# Patient Record
Sex: FEMALE | Race: BLACK OR AFRICAN AMERICAN | Employment: FULL TIME | URBAN - METROPOLITAN AREA
[De-identification: names, ages, dates, MRNs, and addresses within clinical notes are randomized per-mention and may not be internally consistent; named-entity substitution may affect disease eponyms.]

---

## 2021-03-16 ENCOUNTER — HOSPITAL ENCOUNTER (EMERGENCY)
Age: 31
Discharge: HOME | End: 2021-03-16
Attending: EMERGENCY MEDICINE

## 2021-03-16 VITALS
WEIGHT: 190 LBS | TEMPERATURE: 98.2 F | OXYGEN SATURATION: 100 % | SYSTOLIC BLOOD PRESSURE: 151 MMHG | RESPIRATION RATE: 18 BRPM | HEART RATE: 96 BPM | BODY MASS INDEX: 34.96 KG/M2 | DIASTOLIC BLOOD PRESSURE: 69 MMHG | HEIGHT: 62 IN

## 2021-03-16 DIAGNOSIS — K64.4 EXTERNAL HEMORRHOID: Primary | ICD-10-CM

## 2021-03-16 PROCEDURE — 99282 EMERGENCY DEPT VISIT SF MDM: CPT

## 2021-03-16 RX ORDER — HYDROCORTISONE ACETATE 25 MG/1
25 SUPPOSITORY RECTAL 2 TIMES DAILY
Qty: 12 SUPPOSITORY | Refills: 1 | Status: SHIPPED | OUTPATIENT
Start: 2021-03-16 | End: 2022-01-01

## 2021-03-16 RX ORDER — DOCUSATE SODIUM 100 MG/1
100 CAPSULE, LIQUID FILLED ORAL 2 TIMES DAILY
Qty: 60 CAP | Refills: 0 | Status: SHIPPED | OUTPATIENT
Start: 2021-03-16 | End: 2021-04-15

## 2021-03-16 NOTE — ED PROVIDER NOTES
39-year-old female presents with complaint of anal pain that worsened this morning. Patient reports recent loose stools. States occasional small amount of blood with wiping. Denies any recent straining or constipation. Denies abdominal pain, nausea, vomiting, fever, chills, dysuria, hematuria, pelvic pain, vaginal bleeding, vaginal discharge, flank pain, dizziness, weakness. Patient denies any recent sick contact. Patient states last menstrual cycle was around a week ago. Patient declines urine pregnancy test at this time. Patient states that she needs a work note for today and yesterday. No past medical history on file. No past surgical history on file. No family history on file.     Social History     Socioeconomic History    Marital status: SINGLE     Spouse name: Not on file    Number of children: Not on file    Years of education: Not on file    Highest education level: Not on file   Occupational History    Not on file   Social Needs    Financial resource strain: Not on file    Food insecurity     Worry: Not on file     Inability: Not on file    Transportation needs     Medical: Not on file     Non-medical: Not on file   Tobacco Use    Smoking status: Never Smoker   Substance and Sexual Activity    Alcohol use: No    Drug use: Not on file    Sexual activity: Not on file   Lifestyle    Physical activity     Days per week: Not on file     Minutes per session: Not on file    Stress: Not on file   Relationships    Social connections     Talks on phone: Not on file     Gets together: Not on file     Attends Confucianism service: Not on file     Active member of club or organization: Not on file     Attends meetings of clubs or organizations: Not on file     Relationship status: Not on file    Intimate partner violence     Fear of current or ex partner: Not on file     Emotionally abused: Not on file     Physically abused: Not on file     Forced sexual activity: Not on file Other Topics Concern    Not on file   Social History Narrative    Not on file         ALLERGIES: Patient has no known allergies. Review of Systems   Constitutional: Negative for chills, fatigue and fever. HENT: Negative for congestion and rhinorrhea. Respiratory: Negative for cough and shortness of breath. Cardiovascular: Negative for chest pain. Gastrointestinal: Positive for diarrhea and rectal pain. Negative for abdominal pain, blood in stool, constipation, nausea and vomiting. Genitourinary: Negative for dysuria, flank pain, pelvic pain, vaginal bleeding and vaginal discharge. Musculoskeletal: Negative for arthralgias and back pain. Skin: Negative for color change, rash and wound. Neurological: Negative for dizziness, weakness, light-headedness and headaches. Vitals:    03/16/21 0612   BP: (!) 151/69   Pulse: 96   Resp: 18   Temp: 98.2 °F (36.8 °C)   SpO2: 100%   Weight: 86.2 kg (190 lb)   Height: 5' 2\" (1.575 m)            Physical Exam  Vitals signs and nursing note reviewed. Exam conducted with a chaperone present. Constitutional:       Appearance: Normal appearance. HENT:      Head: Normocephalic. Mouth/Throat:      Mouth: Mucous membranes are moist.   Eyes:      Extraocular Movements: Extraocular movements intact. Pupils: Pupils are equal, round, and reactive to light. Cardiovascular:      Rate and Rhythm: Normal rate. Pulses: Normal pulses. Pulmonary:      Effort: Pulmonary effort is normal.      Breath sounds: Normal breath sounds. Abdominal:      General: Bowel sounds are normal.      Palpations: Abdomen is soft. Tenderness: There is no abdominal tenderness. There is no guarding or rebound. Comments: Soft, nontender, nondistended. No rebound or guarding. No peritoneal signs. Genitourinary:     Comments: RENE Castillo present for exam.  Small external hemorrhoid noted. No evidence of thrombosed external hemorrhoid.   No bright red blood per rectum. Brown stool. Hemoccult negative. No fissures. No findings suggestive of perianal abscess. Skin:     General: Skin is warm. Findings: No rash. Neurological:      General: No focal deficit present. Mental Status: She is alert and oriented to person, place, and time. Motor: No weakness. MDM  Number of Diagnoses or Management Options  External hemorrhoid: minor  Diagnosis management comments: VSS. Afebrile. Patient with findings small external hemorrhoid noted. No evidence of thrombosed external hemorrhoid. No fissures. No bright red blood per rectum. Brown stool. Hemoccult negative. Abdomen soft, nontender with no rebound or guarding. Patient declines urine pregnancy test.  Patient states that \"there is no way that I am pregnant at this time\". Will discharge home with Anusol, Colace, instructions to follow PCP. Patient given strict return precautions       Amount and/or Complexity of Data Reviewed  Review and summarize past medical records: yes    Risk of Complications, Morbidity, and/or Mortality  Presenting problems: low  Diagnostic procedures: minimal  Management options: minimal    Patient Progress  Patient progress: stable         Procedures                   Tc Gao MD; 3/16/2021 @6:19 AM Voice dictation software was used during the making of this note. This software is not perfect and grammatical and other typographical errors may be present.   This note has not been proofread for errors.  ===================================================================

## 2021-03-16 NOTE — ED NOTES
I have reviewed discharge instructions with the patient. The patient verbalized understanding. Patient left ED via Discharge Method: ambulatory to Home with friend. Opportunity for questions and clarification provided. Patient given 2 scripts. To continue your aftercare when you leave the hospital, you may receive an automated call from our care team to check in on how you are doing. This is a free service and part of our promise to provide the best care and service to meet your aftercare needs.  If you have questions, or wish to unsubscribe from this service please call [phone]. Thank you for Choosing our [facility].

## 2021-03-16 NOTE — Clinical Note
[address] [address] EMERGENCY DEPT 
[address] 
Kisha [place] [id number] [phone] Work/School Note Date: 3/16/2021 To Whom It May concern: 
 
 
Georgina Roland was seen and treated today in the emergency room by the following provider(s): 
Attending Provider: Silas Man MD.   
 
Georgina Roland is excused from work/school on 03/16/21. She is clear to return to work/school on 03/17/21.    
 
 
Sincerely, 
 
 
 
 
Babatunde Mulligan MD

## 2021-03-16 NOTE — DISCHARGE INSTRUCTIONS
Use Anusol suppositories as directed. Eat high-fiber diet drink plenty of fluids. Take stool softeners directed. Schedule close follow-up primary care physician. Return to ED if symptoms worsen or progress in any way.

## 2021-12-29 ENCOUNTER — HOSPITAL ENCOUNTER (INPATIENT)
Age: 31
LOS: 3 days | Discharge: HOME | DRG: 812 | End: 2022-01-01
Attending: EMERGENCY MEDICINE | Admitting: FAMILY MEDICINE

## 2021-12-29 ENCOUNTER — APPOINTMENT (OUTPATIENT)
Dept: GENERAL RADIOLOGY | Age: 31
DRG: 812 | End: 2021-12-29
Attending: EMERGENCY MEDICINE

## 2021-12-29 DIAGNOSIS — E66.09 CLASS 2 OBESITY DUE TO EXCESS CALORIES WITHOUT SERIOUS COMORBIDITY WITH BODY MASS INDEX (BMI) OF 37.0 TO 37.9 IN ADULT: Chronic | ICD-10-CM

## 2021-12-29 DIAGNOSIS — N92.0 MENORRHAGIA WITH REGULAR CYCLE: Chronic | ICD-10-CM

## 2021-12-29 DIAGNOSIS — N93.9 ABNORMAL UTERINE BLEEDING (AUB): ICD-10-CM

## 2021-12-29 DIAGNOSIS — D50.0 IRON DEFICIENCY ANEMIA DUE TO CHRONIC BLOOD LOSS: Primary | ICD-10-CM

## 2021-12-29 DIAGNOSIS — R07.9 CHEST PAIN, UNSPECIFIED TYPE: ICD-10-CM

## 2021-12-29 PROBLEM — K92.2 GASTROINTESTINAL BLEED: Status: ACTIVE | Noted: 2021-12-29

## 2021-12-29 PROBLEM — D62 ACUTE BLOOD LOSS ANEMIA: Status: ACTIVE | Noted: 2021-12-29

## 2021-12-29 LAB
ALBUMIN SERPL-MCNC: 3.3 G/DL (ref 3.5–5)
ALBUMIN/GLOB SERPL: 0.7 {RATIO} (ref 1.2–3.5)
ALP SERPL-CCNC: 71 U/L (ref 50–136)
ALT SERPL-CCNC: 12 U/L (ref 12–65)
ANION GAP SERPL CALC-SCNC: 4 MMOL/L (ref 7–16)
AST SERPL-CCNC: 11 U/L (ref 15–37)
ATRIAL RATE: 100 BPM
BASOPHILS # BLD: 0 K/UL (ref 0–0.2)
BASOPHILS NFR BLD: 1 % (ref 0–2)
BILIRUB SERPL-MCNC: 0.2 MG/DL (ref 0.2–1.1)
BUN SERPL-MCNC: 5 MG/DL (ref 6–23)
CALCIUM SERPL-MCNC: 8.5 MG/DL (ref 8.3–10.4)
CALCULATED P AXIS, ECG09: 57 DEGREES
CALCULATED R AXIS, ECG10: 65 DEGREES
CALCULATED T AXIS, ECG11: 54 DEGREES
CHLORIDE SERPL-SCNC: 112 MMOL/L (ref 98–107)
CO2 SERPL-SCNC: 22 MMOL/L (ref 21–32)
COVID-19 RAPID TEST, COVR: NOT DETECTED
CREAT SERPL-MCNC: 0.55 MG/DL (ref 0.6–1)
DIAGNOSIS, 93000: NORMAL
DIFFERENTIAL METHOD BLD: ABNORMAL
EOSINOPHIL # BLD: 0.2 K/UL (ref 0–0.8)
EOSINOPHIL NFR BLD: 3 % (ref 0.5–7.8)
ERYTHROCYTE [DISTWIDTH] IN BLOOD BY AUTOMATED COUNT: 22.5 % (ref 11.9–14.6)
GLOBULIN SER CALC-MCNC: 4.7 G/DL (ref 2.3–3.5)
GLUCOSE SERPL-MCNC: 100 MG/DL (ref 65–100)
HCT VFR BLD AUTO: 16.8 % (ref 35.8–46.3)
HCT VFR BLD AUTO: 23.2 % (ref 35.8–46.3)
HGB BLD-MCNC: 4 G/DL (ref 11.7–15.4)
HGB BLD-MCNC: 6.5 G/DL (ref 11.7–15.4)
HISTORY CHECKED?,CKHIST: NORMAL
IMM GRANULOCYTES # BLD AUTO: 0 K/UL (ref 0–0.5)
IMM GRANULOCYTES NFR BLD AUTO: 0 % (ref 0–5)
LIPASE SERPL-CCNC: 54 U/L (ref 73–393)
LYMPHOCYTES # BLD: 2.2 K/UL (ref 0.5–4.6)
LYMPHOCYTES NFR BLD: 36 % (ref 13–44)
MAGNESIUM SERPL-MCNC: 2.5 MG/DL (ref 1.8–2.4)
MCH RBC QN AUTO: 13.7 PG (ref 26.1–32.9)
MCHC RBC AUTO-ENTMCNC: 23.8 G/DL (ref 31.4–35)
MCV RBC AUTO: 57.5 FL (ref 79.6–97.8)
MONOCYTES # BLD: 0.3 K/UL (ref 0.1–1.3)
MONOCYTES NFR BLD: 6 % (ref 4–12)
NEUTS SEG # BLD: 3.3 K/UL (ref 1.7–8.2)
NEUTS SEG NFR BLD: 54 % (ref 43–78)
NRBC # BLD: 0 K/UL (ref 0–0.2)
P-R INTERVAL, ECG05: 122 MS
PLATELET # BLD AUTO: 407 K/UL (ref 150–450)
PMV BLD AUTO: ABNORMAL FL (ref 9.4–12.3)
POTASSIUM SERPL-SCNC: 3.9 MMOL/L (ref 3.5–5.1)
PROT SERPL-MCNC: 8 G/DL (ref 6.3–8.2)
Q-T INTERVAL, ECG07: 384 MS
QRS DURATION, ECG06: 68 MS
QTC CALCULATION (BEZET), ECG08: 495 MS
RBC # BLD AUTO: 2.92 M/UL (ref 4.05–5.2)
SODIUM SERPL-SCNC: 138 MMOL/L (ref 136–145)
SOURCE, COVRS: NORMAL
TROPONIN-HIGH SENSITIVITY: 42 PG/ML (ref 0–14)
TROPONIN-HIGH SENSITIVITY: 43.6 PG/ML (ref 0–14)
VENTRICULAR RATE, ECG03: 100 BPM
WBC # BLD AUTO: 6.2 K/UL (ref 4.3–11.1)

## 2021-12-29 PROCEDURE — 30233N1 TRANSFUSION OF NONAUTOLOGOUS RED BLOOD CELLS INTO PERIPHERAL VEIN, PERCUTANEOUS APPROACH: ICD-10-PCS | Performed by: INTERNAL MEDICINE

## 2021-12-29 PROCEDURE — 93005 ELECTROCARDIOGRAM TRACING: CPT | Performed by: EMERGENCY MEDICINE

## 2021-12-29 PROCEDURE — 94762 N-INVAS EAR/PLS OXIMTRY CONT: CPT

## 2021-12-29 PROCEDURE — 85018 HEMOGLOBIN: CPT

## 2021-12-29 PROCEDURE — 86900 BLOOD TYPING SEROLOGIC ABO: CPT

## 2021-12-29 PROCEDURE — 83690 ASSAY OF LIPASE: CPT

## 2021-12-29 PROCEDURE — 80053 COMPREHEN METABOLIC PANEL: CPT

## 2021-12-29 PROCEDURE — 36430 TRANSFUSION BLD/BLD COMPNT: CPT

## 2021-12-29 PROCEDURE — 85025 COMPLETE CBC W/AUTO DIFF WBC: CPT

## 2021-12-29 PROCEDURE — 86923 COMPATIBILITY TEST ELECTRIC: CPT

## 2021-12-29 PROCEDURE — P9016 RBC LEUKOCYTES REDUCED: HCPCS

## 2021-12-29 PROCEDURE — 71046 X-RAY EXAM CHEST 2 VIEWS: CPT

## 2021-12-29 PROCEDURE — 65270000029 HC RM PRIVATE

## 2021-12-29 PROCEDURE — 83735 ASSAY OF MAGNESIUM: CPT

## 2021-12-29 PROCEDURE — 87635 SARS-COV-2 COVID-19 AMP PRB: CPT

## 2021-12-29 PROCEDURE — 36415 COLL VENOUS BLD VENIPUNCTURE: CPT

## 2021-12-29 PROCEDURE — 84484 ASSAY OF TROPONIN QUANT: CPT

## 2021-12-29 PROCEDURE — 99284 EMERGENCY DEPT VISIT MOD MDM: CPT

## 2021-12-29 RX ORDER — SODIUM CHLORIDE 0.9 % (FLUSH) 0.9 %
5-40 SYRINGE (ML) INJECTION EVERY 8 HOURS
Status: DISCONTINUED | OUTPATIENT
Start: 2021-12-29 | End: 2021-12-30

## 2021-12-29 RX ORDER — SODIUM CHLORIDE 9 MG/ML
250 INJECTION, SOLUTION INTRAVENOUS AS NEEDED
Status: DISCONTINUED | OUTPATIENT
Start: 2021-12-29 | End: 2021-12-30

## 2021-12-29 RX ORDER — POLYETHYLENE GLYCOL 3350 17 G/17G
17 POWDER, FOR SOLUTION ORAL DAILY PRN
Status: DISCONTINUED | OUTPATIENT
Start: 2021-12-29 | End: 2022-01-01 | Stop reason: HOSPADM

## 2021-12-29 RX ORDER — ONDANSETRON 4 MG/1
4 TABLET, ORALLY DISINTEGRATING ORAL
Status: DISCONTINUED | OUTPATIENT
Start: 2021-12-29 | End: 2022-01-01 | Stop reason: HOSPADM

## 2021-12-29 RX ORDER — ONDANSETRON 2 MG/ML
4 INJECTION INTRAMUSCULAR; INTRAVENOUS
Status: DISCONTINUED | OUTPATIENT
Start: 2021-12-29 | End: 2022-01-01 | Stop reason: HOSPADM

## 2021-12-29 RX ORDER — SODIUM CHLORIDE 0.9 % (FLUSH) 0.9 %
5-10 SYRINGE (ML) INJECTION EVERY 8 HOURS
Status: DISCONTINUED | OUTPATIENT
Start: 2021-12-29 | End: 2022-01-01 | Stop reason: HOSPADM

## 2021-12-29 RX ORDER — SODIUM CHLORIDE 0.9 % (FLUSH) 0.9 %
5-40 SYRINGE (ML) INJECTION AS NEEDED
Status: DISCONTINUED | OUTPATIENT
Start: 2021-12-29 | End: 2022-01-01 | Stop reason: HOSPADM

## 2021-12-29 RX ORDER — ENOXAPARIN SODIUM 100 MG/ML
40 INJECTION SUBCUTANEOUS DAILY
Status: CANCELLED | OUTPATIENT
Start: 2021-12-30

## 2021-12-29 RX ORDER — ACETAMINOPHEN 650 MG/1
650 SUPPOSITORY RECTAL
Status: DISCONTINUED | OUTPATIENT
Start: 2021-12-29 | End: 2022-01-01 | Stop reason: HOSPADM

## 2021-12-29 RX ORDER — ACETAMINOPHEN 325 MG/1
650 TABLET ORAL
Status: DISCONTINUED | OUTPATIENT
Start: 2021-12-29 | End: 2022-01-01 | Stop reason: HOSPADM

## 2021-12-29 RX ORDER — SODIUM CHLORIDE 0.9 % (FLUSH) 0.9 %
5-10 SYRINGE (ML) INJECTION AS NEEDED
Status: DISCONTINUED | OUTPATIENT
Start: 2021-12-29 | End: 2022-01-01 | Stop reason: HOSPADM

## 2021-12-29 RX ADMIN — Medication 10 ML: at 21:04

## 2021-12-29 NOTE — ED PROVIDER NOTES
Mask was worn during the entire patient examination. Rusty Rodriguez is a 27 y.o. female who presents to the ED with a chief complaint of fatigue for several weeks along with some headaches. She states that last night she began to have some shortness of breath and chest pain. She also had a cough. On further questioning her she does admit to longstanding history of heavy vaginal periods. She is not having any of these today. She is never been on iron pills. Never noted that she was anemic. No past medical history on file. No past surgical history on file. No family history on file. Social History     Socioeconomic History    Marital status: SINGLE     Spouse name: Not on file    Number of children: Not on file    Years of education: Not on file    Highest education level: Not on file   Occupational History    Not on file   Tobacco Use    Smoking status: Never Smoker    Smokeless tobacco: Not on file   Substance and Sexual Activity    Alcohol use: No    Drug use: Not on file    Sexual activity: Not on file   Other Topics Concern    Not on file   Social History Narrative    Not on file     Social Determinants of Health     Financial Resource Strain:     Difficulty of Paying Living Expenses: Not on file   Food Insecurity:     Worried About Running Out of Food in the Last Year: Not on file    Donell of Food in the Last Year: Not on file   Transportation Needs:     Lack of Transportation (Medical): Not on file    Lack of Transportation (Non-Medical):  Not on file   Physical Activity:     Days of Exercise per Week: Not on file    Minutes of Exercise per Session: Not on file   Stress:     Feeling of Stress : Not on file   Social Connections:     Frequency of Communication with Friends and Family: Not on file    Frequency of Social Gatherings with Friends and Family: Not on file    Attends Pentecostalism Services: Not on file    Active Member of Clubs or Organizations: Not on file    Attends Club or Organization Meetings: Not on file    Marital Status: Not on file   Intimate Partner Violence:     Fear of Current or Ex-Partner: Not on file    Emotionally Abused: Not on file    Physically Abused: Not on file    Sexually Abused: Not on file   Housing Stability:     Unable to Pay for Housing in the Last Year: Not on file    Number of [place] in the Last Year: Not on file    Unstable Housing in the Last Year: Not on file         ALLERGIES: Patient has no known allergies. Review of Systems   Constitutional: Positive for fatigue. Negative for activity change, chills and fever. Respiratory: Positive for cough and shortness of breath. Negative for apnea, chest tightness, wheezing and stridor. Cardiovascular: Positive for chest pain. Negative for palpitations and leg swelling. Gastrointestinal: Negative for abdominal distention, abdominal pain, diarrhea, nausea and vomiting. Genitourinary: Positive for vaginal bleeding. Musculoskeletal: Negative for neck pain and neck stiffness. Skin: Negative for pallor and rash. Neurological: Positive for weakness. All other systems reviewed and are negative. Vitals:    12/29/21 1419   BP: 128/68   Pulse: 96   Resp: 16   Temp: 98.6 °F (37 °C)   SpO2: 100%   Weight: 93 kg (205 lb)   Height: 5' 2\" (1.575 m)            Physical Exam  Vitals and nursing note reviewed. Constitutional:       General: She is not in acute distress. Appearance: She is well-developed. She is not ill-appearing, toxic-appearing or diaphoretic. HENT:      Head: Normocephalic and atraumatic. Mouth/Throat:      Mouth: Mucous membranes are moist.   Eyes:      General: No scleral icterus. Right eye: No discharge. Left eye: No discharge. Comments: Pale conjuncitiva. Neck:      Thyroid: No thyromegaly. Cardiovascular:      Rate and Rhythm: Normal rate and regular rhythm. Heart sounds: Normal heart sounds.  Heart sounds not distant. No murmur heard. No systolic murmur is present. Pulmonary:      Effort: Pulmonary effort is normal. No tachypnea, accessory muscle usage or respiratory distress. Breath sounds: No stridor. No decreased breath sounds, wheezing, rhonchi or rales. Chest:      Chest wall: No tenderness, crepitus or edema. There is no dullness to percussion. Abdominal:      Palpations: Abdomen is soft. There is no mass. Tenderness: There is no abdominal tenderness. There is no guarding or rebound. Musculoskeletal:      Cervical back: Normal range of motion and neck supple. No rigidity. Right lower leg: No tenderness. No edema. Left lower leg: No tenderness. No edema. Lymphadenopathy:      Cervical: No cervical adenopathy. Skin:     Capillary Refill: Capillary refill takes less than 2 seconds. Neurological:      General: No focal deficit present. Mental Status: She is alert and oriented to person, place, and time. Mental status is at baseline. Psychiatric:         Mood and Affect: Mood normal. Mood is not anxious. Behavior: Behavior normal. Behavior is not agitated. MDM  Number of Diagnoses or Management Options  Diagnosis management comments: Patient has severe anemia with hemoglobin of 4 thus I went ahead and order 2 units instead of the 1 recommended by our blood bank department as she is obviously got any more than 1 unit of packed red blood cells. She will be admitted for further evaluation of this anemia. Bety Horn MD; 12/29/2021 @3:02 PM Voice dictation software was used during the making of this note. This software is not perfect and grammatical and other typographical errors may be present.   This note has not been proofread for errors.  ====================================        Amount and/or Complexity of Data Reviewed  Clinical lab tests: ordered and reviewed (Results for orders placed or performed during the hospital encounter of 12/29/21  -TROPONIN-HIGH SENSITIVITY:        Result                      Value             Ref Range           Troponin-High Sensitiv*     43.6 (H)          0 - 14 pg/mL   -CBC WITH AUTOMATED DIFF:        Result                      Value             Ref Range           WBC                         6.2               4.3 - 11.1 K*       RBC                         2.92 (L)          4.05 - 5.2 M*       HGB                         4.0 (LL)          11.7 - 15.4 *       HCT                         16.8 (L)          35.8 - 46.3 %       MCV                         57.5 (L)          79.6 - 97.8 *       MCH                         13.7 (L)          26.1 - 32.9 *       MCHC                        23.8 (L)          31.4 - 35.0 *       RDW                         22.5 (H)          11.9 - 14.6 %       PLATELET                    407               150 - 450 K/*       MPV                                           9.4 - 12.3 FL   Unable to calculate. Recommend adding IPF. ABSOLUTE NRBC               0.00              0.0 - 0.2 K/*       DF                          AUTOMATED                             NEUTROPHILS                 54                43 - 78 %           LYMPHOCYTES                 36                13 - 44 %           MONOCYTES                   6                 4.0 - 12.0 %        EOSINOPHILS                 3                 0.5 - 7.8 %         BASOPHILS                   1                 0.0 - 2.0 %         IMMATURE GRANULOCYTES       0                 0.0 - 5.0 %         ABS. NEUTROPHILS            3.3               1.7 - 8.2 K/*       ABS. LYMPHOCYTES            2.2               0.5 - 4.6 K/*       ABS. MONOCYTES              0.3               0.1 - 1.3 K/*       ABS. EOSINOPHILS            0.2               0.0 - 0.8 K/*       ABS. BASOPHILS              0.0               0.0 - 0.2 K/*       ABS. IMM.  GRANS.            0.0               0.0 - 0.5 K/*  -METABOLIC PANEL, COMPREHENSIVE:        Result Value             Ref Range           Sodium                      138               136 - 145 mm*       Potassium                   3.9               3.5 - 5.1 mm*       Chloride                    112 (H)           98 - 107 mmo*       CO2                         22                21 - 32 mmol*       Anion gap                   4 (L)             7 - 16 mmol/L       Glucose                     100               65 - 100 mg/*       BUN                         5 (L)             6 - 23 MG/DL        Creatinine                  0.55 (L)          0.6 - 1.0 MG*       GFR est AA                  >60               >60 ml/min/1*       GFR est non-AA              >60               >60 ml/min/1*       Calcium                     8.5               8.3 - 10.4 M*       Bilirubin, total            0.2               0.2 - 1.1 MG*       ALT (SGPT)                  12                12 - 65 U/L         AST (SGOT)                  11 (L)            15 - 37 U/L         Alk. phosphatase            71                50 - 136 U/L        Protein, total              8.0               6.3 - 8.2 g/*       Albumin                     3.3 (L)           3.5 - 5.0 g/*       Globulin                    4.7 (H)           2.3 - 3.5 g/*       A-G Ratio                   0.7 (L)           1.2 - 3.5      -LIPASE:        Result                      Value             Ref Range           Lipase                      54 (L)            73 - 393 U/L   -MAGNESIUM:        Result                      Value             Ref Range           Magnesium                   2.5 (H)           1.8 - 2.4 mg* )  Tests in the radiology section of CPT®: ordered and reviewed (XR CHEST PA LAT    Result Date: 12/29/2021  Chest 2 view CLINICAL INDICATION: Acute moderate shortness of breath and substernal chest tightness COMPARISON: Abdominal CT 11/12/2016 correlation TECHNIQUE: Upright PA  and lateral views of the chest FINDINGS: Lung volumes are well inflated. Cardiac silhouette is borderline-enlarged. Otherwise mediastinal and hilar appear unremarkable. The lungs are clear. No acute osseous abnormalities are seen.      No acute disease.    )           Procedures

## 2021-12-29 NOTE — ED TRIAGE NOTES
Patient advises starting with chest pain around 1030 last night, described with tightness and shortness of breath. Masked at this time.

## 2021-12-29 NOTE — ASSESSMENT & PLAN NOTE
Admission Hgb <4, given pRBC 2U 12/29, 1U 12/30;  -transfuse below 7  -serial Hgb    12/31: Hgb >7, continue to monitor

## 2021-12-29 NOTE — H&P
Hospitalist History and Physical   Admit Date:  2021  2:42 PM   Name:  Andrew Spears   Age:  27 y.o. Sex:  female  :  1990   MRN:  [id number]   Room:  03/    Presenting Complaint: Chest Pain    Reason(s) for Admission: Acute blood loss anemia [D62]  Gastrointestinal bleed [K92.2]     History of Present Illness:   Andrew Spears is a 27 y.o. female with medical history of heavy menstrual cycles who presented with fatigue and chest pain. She has always had heavy cycles and has not taken anything for it. She has noted several days of progressive weakness. She began having chest pain that was crushing last night. She has noted bleeding from her rectum. She denies n/v/d, difficulty breathing, syncope, swelling, vision changes. Review of Systems:  10 systems reviewed and negative except as noted in HPI. Assessment & Plan:   * Acute blood loss anemia  Admission Hgb 4  -transfuse below 7  -serial Hgb    Gastrointestinal bleed  -consult GI  -NPO midnight    Class 2 obesity due to excess calories without serious comorbidity with body mass index (BMI) of 37.0 to 37.9 in adult  Increased risk of all cause mortality, complicating care  - healthy lifestyle at discharge    There is a high probability of acute organ impairment or life-threatening deterioration in the patient's condition from: acute blood loss anemia  Critical care interventions: blood transfusion  Total critical care time spent: 38 minutes. Time is indicative of direct patient attendance at bedside and on the patient's floor nearby. Includes time spent at bedside performing history and exam, performing chart review, discussing findings and treatment plan with patient and/or family, discussing patient with consultants and colleagues, ordering and reviewing pertinent laboratory and radiographic evaluations, and discussing patient with nursing staff. Time excludes procedures.       Dispo/Discharge Planning:     Pending clinical improvement    Diet: No diet orders on file  VTE ppx: not indicated  Code status: No Order    Hospital Problems as of 12/29/2021 Date Reviewed: 12/29/2021          Codes Class Noted - Resolved POA    Acute blood loss anemia ICD-10-CM: D62  ICD-9-CM: 285.1  12/29/2021 - Present Yes        Gastrointestinal bleed ICD-10-CM: K92.2  ICD-9-CM: 578.9  12/29/2021 - Present Yes              Past History:  No past medical history on file. No past surgical history on file. No Known Allergies   Social History     Tobacco Use    Smoking status: Never Smoker    Smokeless tobacco: Not on file   Substance Use Topics    Alcohol use: No      No family history on file. Family history reviewed and negative except as otherwise noted. There is no immunization history on file for this patient. Prior to Admit Medications:  Current Outpatient Medications   Medication Instructions    hydrocortisone (ANUSOL-HC) 25 mg, Rectal, 2 TIMES DAILY       Objective:     Patient Vitals for the past 24 hrs:   Temp Pulse Resp BP SpO2   12/29/21 1855 98.7 °F (37.1 °C) 88 12 115/63 100 %   12/29/21 1840 98.6 °F (37 °C) 88 16 123/64 100 %   12/29/21 1710 98.8 °F (37.1 °C)  18 135/61 100 %   12/29/21 1655 98.1 °F (36.7 °C) 98  126/73 100 %   12/29/21 1419 98.6 °F (37 °C) 96 16 128/68 100 %     Oxygen Therapy  O2 Sat (%): 100 % (12/29/21 1855)  O2 Device: None (Room air) (12/29/21 1703)    Estimated body mass index is 37.49 kg/m² as calculated from the following:    Height as of this encounter: 5' 2\" (1.575 m). Weight as of this encounter: 93 kg (205 lb). Intake/Output Summary (Last 24 hours) at 12/29/2021 1904  Last data filed at 12/29/2021 1839  Gross per 24 hour   Intake 246.5 ml   Output    Net 246.5 ml         Blood pressure 115/63, pulse 88, temperature 98.7 °F (37.1 °C), resp. rate 12, height 5' 2\" (1.575 m), weight 93 kg (205 lb), SpO2 100 %. Physical Exam  Vitals and nursing note reviewed.    Constitutional: General: She is not in acute distress. Appearance: Normal appearance. She is obese. Comments: affable   HENT:      Head: Normocephalic. Eyes:      Extraocular Movements: Extraocular movements intact. Cardiovascular:      Rate and Rhythm: Normal rate. Pulses:           Radial pulses are 2+ on the left side. Pulmonary:      Effort: Pulmonary effort is normal. No respiratory distress. Abdominal:      General: Bowel sounds are normal. There is no distension. Tenderness: There is no abdominal tenderness. Musculoskeletal:         General: No deformity. Cervical back: No rigidity. Skin:     General: Skin is warm and dry. Coloration: Skin is pale. Neurological:      General: No focal deficit present. Mental Status: She is alert and oriented to person, place, and time.    Psychiatric:         Mood and Affect: Mood normal.         Behavior: Behavior normal.         I have reviewed ordered lab tests and independently visualized imaging below:    Last 24hr Labs:  Recent Results (from the past 24 hour(s))   EKG, 12 LEAD, INITIAL    Collection Time: 12/29/21 12:30 PM   Result Value Ref Range    Ventricular Rate 100 BPM    Atrial Rate 100 BPM    P-R Interval 122 ms    QRS Duration 68 ms    Q-T Interval 384 ms    QTC Calculation (Bezet) 495 ms    Calculated P Axis 57 degrees    Calculated R Axis 65 degrees    Calculated T Axis 54 degrees    Diagnosis       Normal sinus rhythm  Prolonged QT  Abnormal ECG  No previous ECGs available  Confirmed by ST CAMMIE LARSON MD (), DAHIANA LLAMAS ([id number]) on 12/29/2021 4:04:08 PM     TROPONIN-HIGH SENSITIVITY    Collection Time: 12/29/21 12:36 PM   Result Value Ref Range    Troponin-High Sensitivity 43.6 (H) 0 - 14 pg/mL   CBC WITH AUTOMATED DIFF    Collection Time: 12/29/21 12:36 PM   Result Value Ref Range    WBC 6.2 4.3 - 11.1 K/uL    RBC 2.92 (L) 4.05 - 5.2 M/uL    HGB 4.0 (LL) 11.7 - 15.4 g/dL    HCT 16.8 (L) 35.8 - 46.3 %    MCV 57.5 (L) 79.6 - 97.8 FL MCH 13.7 (L) 26.1 - 32.9 PG    MCHC 23.8 (L) 31.4 - 35.0 g/dL    RDW 22.5 (H) 11.9 - 14.6 %    PLATELET 017 822 - 917 K/uL    MPV Unable to calculate. Recommend adding IPF. 9.4 - 12.3 FL    ABSOLUTE NRBC 0.00 0.0 - 0.2 K/uL    DF AUTOMATED      NEUTROPHILS 54 43 - 78 %    LYMPHOCYTES 36 13 - 44 %    MONOCYTES 6 4.0 - 12.0 %    EOSINOPHILS 3 0.5 - 7.8 %    BASOPHILS 1 0.0 - 2.0 %    IMMATURE GRANULOCYTES 0 0.0 - 5.0 %    ABS. NEUTROPHILS 3.3 1.7 - 8.2 K/UL    ABS. LYMPHOCYTES 2.2 0.5 - 4.6 K/UL    ABS. MONOCYTES 0.3 0.1 - 1.3 K/UL    ABS. EOSINOPHILS 0.2 0.0 - 0.8 K/UL    ABS. BASOPHILS 0.0 0.0 - 0.2 K/UL    ABS. IMM. GRANS. 0.0 0.0 - 0.5 K/UL   METABOLIC PANEL, COMPREHENSIVE    Collection Time: 12/29/21 12:36 PM   Result Value Ref Range    Sodium 138 136 - 145 mmol/L    Potassium 3.9 3.5 - 5.1 mmol/L    Chloride 112 (H) 98 - 107 mmol/L    CO2 22 21 - 32 mmol/L    Anion gap 4 (L) 7 - 16 mmol/L    Glucose 100 65 - 100 mg/dL    BUN 5 (L) 6 - 23 MG/DL    Creatinine 0.55 (L) 0.6 - 1.0 MG/DL    GFR est AA >60 >60 ml/min/1.73m2    GFR est non-AA >60 >60 ml/min/1.73m2    Calcium 8.5 8.3 - 10.4 MG/DL    Bilirubin, total 0.2 0.2 - 1.1 MG/DL    ALT (SGPT) 12 12 - 65 U/L    AST (SGOT) 11 (L) 15 - 37 U/L    Alk.  phosphatase 71 50 - 136 U/L    Protein, total 8.0 6.3 - 8.2 g/dL    Albumin 3.3 (L) 3.5 - 5.0 g/dL    Globulin 4.7 (H) 2.3 - 3.5 g/dL    A-G Ratio 0.7 (L) 1.2 - 3.5     LIPASE    Collection Time: 12/29/21 12:36 PM   Result Value Ref Range    Lipase 54 (L) 73 - 393 U/L   MAGNESIUM    Collection Time: 12/29/21 12:36 PM   Result Value Ref Range    Magnesium 2.5 (H) 1.8 - 2.4 mg/dL   COVID-19 RAPID TEST    Collection Time: 12/29/21  2:55 PM   Result Value Ref Range    Specimen source Nasopharyngeal      COVID-19 rapid test Not detected NOTD     TYPE & SCREEN    Collection Time: 12/29/21  3:00 PM   Result Value Ref Range    Crossmatch Expiration 01/01/2022,2359     ABO/Rh(D) A POSITIVE     Antibody screen NEG     Unit number [id number]     Blood component type RC LR     Unit division 00     Status of unit ISSUED     Crossmatch result Compatible     Unit number [id number]     Blood component type RC LR     Unit division 00     Status of unit ISSUED     Crossmatch result Compatible    RBC, ALLOCATE    Collection Time: 12/29/21  3:00 PM   Result Value Ref Range    HISTORY CHECKED? Historical check performed    TROPONIN-HIGH SENSITIVITY    Collection Time: 12/29/21  4:31 PM   Result Value Ref Range    Troponin-High Sensitivity 42.0 (H) 0 - 14 pg/mL       All Micro Results     Procedure Component Value Units Date/Time    COVID-19 RAPID TEST [[id number]] Collected: 12/29/21 1455    Order Status: Completed Specimen: Nasopharyngeal Updated: 12/29/21 1529     Specimen source Nasopharyngeal        COVID-19 rapid test Not detected        Comment:      The specimen is NEGATIVE for SARS-CoV-2, the novel coronavirus associated with COVID-19. A negative result does not rule out COVID-19. This test has been authorized by the FDA under an Emergency Use Authorization (EUA) for use by authorized laboratories. Fact sheet for Healthcare Providers: iTendency.uy  Fact sheet for Patients: iTendency.uy       Methodology: Isothermal Nucleic Acid Amplification               Other Studies:  XR CHEST PA LAT    Result Date: 12/29/2021  Chest 2 view CLINICAL INDICATION: Acute moderate shortness of breath and substernal chest tightness COMPARISON: Abdominal CT 11/12/2016 correlation TECHNIQUE: Upright PA  and lateral views of the chest FINDINGS: Lung volumes are well inflated. Cardiac silhouette is borderline-enlarged. Otherwise mediastinal and hilar appear unremarkable. The lungs are clear. No acute osseous abnormalities are seen. No acute disease.            Signed:  Guillermina Upton MD

## 2021-12-29 NOTE — ED NOTES
TRANSFER - OUT REPORT:    Verbal report given to RENE Minor on Dai Merrill  being transferred to [id number] for routine progression of care       Report consisted of patients Situation, Background, Assessment and   Recommendations(SBAR). Information from the following report(s) SBAR was reviewed with the receiving nurse. Lines:   Peripheral IV 12/29/21 Right Antecubital (Active)   Site Assessment Clean, dry, & intact 12/29/21 1232   Phlebitis Assessment 0 12/29/21 1232   Infiltration Assessment 0 12/29/21 1232   Dressing Status Clean, dry, & intact 12/29/21 1232   Dressing Type Gauze;Tape;Transparent 12/29/21 1232   Hub Color/Line Status Pink;Flushed 12/29/21 1232   Action Taken Blood drawn 12/29/21 1232        Opportunity for questions and clarification was provided.       Patient transported with:   Registered Nurse; Blood Transfusing

## 2021-12-29 NOTE — ACP (ADVANCE CARE PLANNING)
Advance Care Planning     Advance Care Planning Activator (Inpatient)  Conversation Note      Date of ACP Conversation: 12/29/21     Conversation Conducted with:   Patient with Decision Making Capacity. Pt declined having the HCPOA discussion at this time.       ACP Activator: Isaac Angela RN

## 2021-12-30 ENCOUNTER — APPOINTMENT (OUTPATIENT)
Dept: ULTRASOUND IMAGING | Age: 31
DRG: 812 | End: 2021-12-30
Attending: OBSTETRICS & GYNECOLOGY

## 2021-12-30 ENCOUNTER — ANESTHESIA EVENT (OUTPATIENT)
Dept: ENDOSCOPY | Age: 31
DRG: 812 | End: 2021-12-30

## 2021-12-30 PROBLEM — E66.09 CLASS 2 OBESITY DUE TO EXCESS CALORIES WITHOUT SERIOUS COMORBIDITY WITH BODY MASS INDEX (BMI) OF 37.0 TO 37.9 IN ADULT: Chronic | Status: ACTIVE | Noted: 2021-12-30

## 2021-12-30 PROBLEM — N93.9 ABNORMAL UTERINE BLEEDING (AUB): Status: ACTIVE | Noted: 2021-12-30

## 2021-12-30 PROBLEM — N92.0 MENORRHAGIA WITH REGULAR CYCLE: Chronic | Status: ACTIVE | Noted: 2021-12-30

## 2021-12-30 LAB
ANION GAP SERPL CALC-SCNC: 4 MMOL/L (ref 7–16)
BUN SERPL-MCNC: 6 MG/DL (ref 6–23)
CALCIUM SERPL-MCNC: 8.9 MG/DL (ref 8.3–10.4)
CHLORIDE SERPL-SCNC: 110 MMOL/L (ref 98–107)
CO2 SERPL-SCNC: 22 MMOL/L (ref 21–32)
CREAT SERPL-MCNC: 0.52 MG/DL (ref 0.6–1)
GLUCOSE SERPL-MCNC: 91 MG/DL (ref 65–100)
HCT VFR BLD AUTO: 25.9 % (ref 35.8–46.3)
HCT VFR BLD AUTO: 27.3 % (ref 35.8–46.3)
HCT VFR BLD AUTO: 28.6 % (ref 35.8–46.3)
HGB BLD-MCNC: 7.5 G/DL (ref 11.7–15.4)
HGB BLD-MCNC: 8 G/DL (ref 11.7–15.4)
HGB BLD-MCNC: 8.3 G/DL (ref 11.7–15.4)
HISTORY CHECKED?,CKHIST: NORMAL
INR PPP: 1.1
MAGNESIUM SERPL-MCNC: 2.4 MG/DL (ref 1.8–2.4)
MCH RBC QN AUTO: 20 PG (ref 26.1–32.9)
MCHC RBC AUTO-ENTMCNC: 29.3 G/DL (ref 31.4–35)
MCV RBC AUTO: 68.1 FL (ref 79.6–97.8)
NRBC # BLD: 0 K/UL (ref 0–0.2)
PLATELET # BLD AUTO: 323 K/UL (ref 150–450)
PMV BLD AUTO: ABNORMAL FL (ref 9.4–12.3)
POTASSIUM SERPL-SCNC: 3.7 MMOL/L (ref 3.5–5.1)
PROTHROMBIN TIME: 14.4 SEC (ref 12.6–14.5)
RBC # BLD AUTO: 4.01 M/UL (ref 4.05–5.2)
SODIUM SERPL-SCNC: 136 MMOL/L (ref 136–145)
T4 FREE SERPL-MCNC: 1.1 NG/DL (ref 0.78–1.46)
TROPONIN-HIGH SENSITIVITY: 40.9 PG/ML (ref 0–14)
TSH SERPL DL<=0.005 MIU/L-ACNC: 3.02 UIU/ML
WBC # BLD AUTO: 7 K/UL (ref 4.3–11.1)

## 2021-12-30 PROCEDURE — 80048 BASIC METABOLIC PNL TOTAL CA: CPT

## 2021-12-30 PROCEDURE — P9016 RBC LEUKOCYTES REDUCED: HCPCS

## 2021-12-30 PROCEDURE — 99222 1ST HOSP IP/OBS MODERATE 55: CPT | Performed by: INTERNAL MEDICINE

## 2021-12-30 PROCEDURE — 85610 PROTHROMBIN TIME: CPT

## 2021-12-30 PROCEDURE — 84439 ASSAY OF FREE THYROXINE: CPT

## 2021-12-30 PROCEDURE — 94762 N-INVAS EAR/PLS OXIMTRY CONT: CPT

## 2021-12-30 PROCEDURE — 85027 COMPLETE CBC AUTOMATED: CPT

## 2021-12-30 PROCEDURE — 65270000029 HC RM PRIVATE

## 2021-12-30 PROCEDURE — 74011250636 HC RX REV CODE- 250/636: Performed by: PHYSICIAN ASSISTANT

## 2021-12-30 PROCEDURE — 74011000250 HC RX REV CODE- 250: Performed by: PHYSICIAN ASSISTANT

## 2021-12-30 PROCEDURE — 85018 HEMOGLOBIN: CPT

## 2021-12-30 PROCEDURE — C9113 INJ PANTOPRAZOLE SODIUM, VIA: HCPCS | Performed by: PHYSICIAN ASSISTANT

## 2021-12-30 PROCEDURE — 84484 ASSAY OF TROPONIN QUANT: CPT

## 2021-12-30 PROCEDURE — 36415 COLL VENOUS BLD VENIPUNCTURE: CPT

## 2021-12-30 PROCEDURE — 83735 ASSAY OF MAGNESIUM: CPT

## 2021-12-30 PROCEDURE — 36430 TRANSFUSION BLD/BLD COMPNT: CPT

## 2021-12-30 PROCEDURE — 76856 US EXAM PELVIC COMPLETE: CPT

## 2021-12-30 PROCEDURE — 84443 ASSAY THYROID STIM HORMONE: CPT

## 2021-12-30 RX ADMIN — Medication 10 ML: at 14:00

## 2021-12-30 RX ADMIN — SODIUM CHLORIDE 40 MG: 9 INJECTION INTRAMUSCULAR; INTRAVENOUS; SUBCUTANEOUS at 12:03

## 2021-12-30 RX ADMIN — Medication 10 ML: at 21:25

## 2021-12-30 RX ADMIN — Medication 10 ML: at 12:08

## 2021-12-30 NOTE — PROGRESS NOTES
Pt on continuous oximetry - box #19       12/30/21 1613   Oxygen Therapy   O2 Sat (%) 100 %   Pulse via Oximetry 102 beats per minute   O2 Device None (Room air)

## 2021-12-30 NOTE — CONSULTS
[facility] Hospitalist Gynecology Consult Note    Name: Mak Altman MRN: [id number] [id number]: [id number]    YOB: 1990  Age: 27 y.o. Sex: female       Subjective:      Chief complaint:  Symptomatic anemia    Rebecca is a 27 y.o.  female G0 who was admitted to [facility] 12/29/21 with complaint of acte onset shortness of breath and chest pain x 1 day. She also reported several weeks of fatigue, headaches and BRBPR 9 months prior to admission. On initial evaluation she was found to have severe microcytic anemia with an admission hemoglobin of 4.  GI consult and OBGYN consult were requested as patient reported history of heavy menstrual bleeding and rectal bleeding. The current method of family planning is same sex relationship. Past Medical History:   Diagnosis Date    Abnormal uterine bleeding (AUB) 12/30/2021   obesity    Past Surgical History  None    OB History  G0    Past Gynecological History  Menarche at 5years old. Menses was monthly and normal until 2017 or 2018--she then developed heavy menstrual bleeding every 28 days with 7 days of menses, using 8 pads per day. Denies any hx of STIs or abnormal pap smear. Last pap smear 2017 in [facility]. No Known Allergies  Prior to Admission medications    Medication Sig Start Date End Date Taking? Authorizing Provider   hydrocortisone (Anusol-HC) 25 mg supp Insert 1 Suppository into rectum two (2) times a day.   Patient not taking: Reported on 12/29/2021 3/16/21   Giovanna Bradley MD      Family History   Problem Relation Age of Onset    Heart Disease Mother      Relationship history - female partners only, currently in long term partnership   Social History     Socioeconomic History    Marital status: SINGLE     Spouse name: Not on file    Number of children: Not on file    Years of education: Not on file    Highest education level: Not on file   Occupational History    Not on file   Tobacco Use    Smoking status: Never Smoker    Smokeless tobacco: Not on file   Substance and Sexual Activity    Alcohol use: No    Drug use: Not on file    Sexual activity: Not on file   Other Topics Concern     Service Not Asked    Blood Transfusions Not Asked    Caffeine Concern Not Asked    Occupational Exposure Not Asked    Hobby Hazards Not Asked    Sleep Concern Not Asked    Stress Concern Not Asked    Weight Concern Not Asked    Special Diet Not Asked    Back Care Not Asked    Exercise Not Asked    Bike Helmet Not Asked   [address] Not Asked    Self-Exams Not Asked   Social History Narrative    Not on file     Social Determinants of Health     Financial Resource Strain:     Difficulty of Paying Living Expenses: Not on file   Food Insecurity:     Worried About Running Out of Food in the Last Year: Not on file    Donell of Food in the Last Year: Not on file   Transportation Needs:     Lack of Transportation (Medical): Not on file    Lack of Transportation (Non-Medical):  Not on file   Physical Activity:     Days of Exercise per Week: Not on file    Minutes of Exercise per Session: Not on file   Stress:     Feeling of Stress : Not on file   Social Connections:     Frequency of Communication with Friends and Family: Not on file    Frequency of Social Gatherings with Friends and Family: Not on file    Attends [employer] Services: Not on file    Active Member of [address] [place] [address] or Organizations: Not on file    Attends Club or Organization Meetings: Not on file    Marital Status: Not on file   Intimate Partner Violence:     Fear of Current or Ex-Partner: Not on file    Emotionally Abused: Not on file    Physically Abused: Not on file    Sexually Abused: Not on file   Housing Stability:     Unable to Pay for Housing in the Last Year: Not on file    Number of [place] in the Last Year: Not on file    Unstable Housing in the Last Year: Not on file       Review of Systems   Constitutional: Fatigue present  HENT: Negative. Eyes: Negative. Respiratory: Negative. Cardiovascular: SOB and chest pain on admission, now resolved   Gastrointestinal: BRBPR 9 months ago  Genitourinary: Heavy menses  Musculoskeletal: Negative. Skin: Negative. Neurological: Negative. Endo/Heme/Allergies: Negative. Psychiatric/Behavioral: Negative. Objective:     Vitals:    12/30/21 0430 12/30/21 0535 12/30/21 0613 12/30/21 0804   BP: 121/75 124/80 124/74 121/68   Pulse: 77 85 80 87   Resp: 18 18 18 18   Temp: 98.8 °F (37.1 °C) 98.9 °F (37.2 °C) 99 °F (37.2 °C) 98.2 °F (36.8 °C)   SpO2: 100% 99% 99% 100%   Weight:       Height:           Physical Exam   Constitutional: The patient appears well, alert, oriented x 3. Cardiovascular: Heart RRR, no murmurs. Respiratory: Lungs clear, no respiratory distress  GI: Abdomen soft, nontender, no guarding, obese  Musculoskeletal: no cva tenderness  Upper ext: no edema, reflexes +2  Lower ext: no edema, neg corby's, reflexes +2  Skin: no rashes or lesions  Psychiatric:Mood/ Affect: appropriate  Genitourinary: speculum exam deferred - patient has never had vaginal intercourse  Normal external female genitalia  Bimanual exam with virginal introitus, cervix without masses, no adnexal masses or uterine masses appreciated, exam limited by body habitus  Rectal exam: deferred      Assessment/Plan:     28 yo G0 female admitted with CP and SOB in the setting of microcytic anemia, hemoglobin = 4    1) AUB - patient reports heavy menses began after 80 lb weight gain in 2017 or 2018. Menses has been regular, not painful.   She denies any bleeding abnormalities, easy bruising etc.  She has never had penetrative/vaginal intercourse, so will defer chlamydia screening at her request.  Pelvic ultrasound ordered to evaluate for anatomical causes of AUB (fibroids, polyps, endometrial stripe thickness), recommend iron supplementation, pending ultrasound results would offer 7 days of provera every month to maintain regular shedding of endometrium in the setting of probable anovulatory bleeding. Will check thyroid studies as well. Would recommend diabetes screening. 2) anemia - see above, GI evaluation in progress      I have personally reviewed the patient's history, pertinent test results, care everywhere, vital sign trends, radiology reports, laboratory results, previous provider notes support my clinical impression. Thank you for the consultation, we will follow up ultrasound results and arrange outpatient GYN follow up as well.

## 2021-12-30 NOTE — H&P (VIEW-ONLY)
[facility] Consult Note       Primary GI Physician: [facility]    Consulting GI Physician: Dr. Michelle Chong    Referring Provider:  Dr. Marilyn Stanley Date:  12/30/2021    Admit Date:  12/29/2021    Chief Complaint:  GI bleed    Subjective:     History of Present Illness:  Patient is a 27 y.o. female being seen in GI consult at the request of Dr. Raf Cates for GI bleed. She was admitted to [facility] 12/29/21 after she presented with complaints of fatigue, headaches for several weeks and new onset shortness of breath and chest pain the night before admission. Labs on admission revealed severe microcytic anemia with Hgb 4.0, MCV 57.5 with normal WBC, platelets, BUN/Cr, LFTs, Lipase. She has received 2u pRBCs with improvement to Hgb 8.0.  HS troponins have been elevated some at 43.6 and 42. CXR 12/29/21 was negative for acute findings. She denies known history of anemia. She denies use of oral or IV iron. She reports a history of heavy menstrual cycles. Last cycle was early 12/2021. She did see a small amount of red blood on the tissue when wiping 3/2021 and this was after straining to have a BM. She reports regular bowel patterns since then. She denies recurrent BRBPR and denies melena since then. She denies abdominal pain, N/V, reflux/heartburn, dysphagia, anorexia, unintentional weight loss. Now chest pain and shortness of breath are improved. She did have some weakness and this is improving as well. She reports frequent use of BC powders and Ibuprofen mostly around the time of her cycle but even in between. She denies tobacco or alcohol use. She denies prior EGD or Colonoscopy. PMH:  No past medical history on file. PSH:  No past surgical history on file. Allergies:  No Known Allergies    Home Medications:  Prior to Admission medications    Medication Sig Start Date End Date Taking?  Authorizing Provider   hydrocortisone (Anusol-HC) 25 mg supp Insert 1 Suppository into rectum two (2) times a day. Patient not taking: Reported on 12/29/2021 3/16/21   Hayley Sargent MD       Hospital Medications:  Current Facility-Administered Medications   Medication Dose Route Frequency    sodium chloride (NS) flush 5-10 mL  5-10 mL IntraVENous Q8H    sodium chloride (NS) flush 5-10 mL  5-10 mL IntraVENous PRN    sodium chloride (NS) flush 5-40 mL  5-40 mL IntraVENous PRN    acetaminophen (TYLENOL) tablet 650 mg  650 mg Oral Q6H PRN    Or    acetaminophen (TYLENOL) suppository 650 mg  650 mg Rectal Q6H PRN    polyethylene glycol (MIRALAX) packet 17 g  17 g Oral DAILY PRN    ondansetron (ZOFRAN ODT) tablet 4 mg  4 mg Oral Q8H PRN    Or    ondansetron (ZOFRAN) injection 4 mg  4 mg IntraVENous Q6H PRN    influenza vaccine 2021-22 (6 mos+)(PF) (FLUARIX/FLULAVAL/FLUZONE QUAD) injection 0.5 mL  1 Each IntraMUSCular PRIOR TO DISCHARGE       Social History:  Social History     Tobacco Use    Smoking status: Never Smoker    Smokeless tobacco: Not on file   Substance Use Topics    Alcohol use: No       Family History:  Family History   Problem Relation Age of Onset    Heart Disease Mother      Per pt, aunt and some cousins had stomach cancer. No known Fhx colon cancer. Review of Systems:  A detailed 10 system ROS is obtained, with pertinent positives as listed above. All others are negative. Diet:  NPO    Objective:     Physical Exam:  Vitals:  Visit Vitals  /74 (BP 1 Location: Left upper arm, BP Patient Position: At rest)   Pulse 80   Temp 99 °F (37.2 °C)   Resp 18   Ht 5' 2\" (1.575 m)   Wt 93 kg (205 lb)   SpO2 99%   BMI 37.49 kg/m²     Gen:  Pt is alert, cooperative, no acute distress  Skin:  Face reveal no rashes. HEENT: Sclerae anicteric. Cardiovascular: Regular rate and rhythm. Respiratory:  Comfortable breathing with no accessory muscle use. Clear breath sounds anteriorly with no wheezes, rales, or rhonchi.   GI:  Abdomen obese, soft, and Nontender. Normal active bowel sounds. No masses palpable. Rectal:  Deferred  Musculoskeletal:  No pitting edema of the lower legs. Neurological:  Gross memory appears intact. Patient is alert and oriented. Psychiatric:  Mood appears appropriate with judgement intact. Laboratory:    Recent Labs     12/30/21  0710 12/29/21  2201 12/29/21  1236   WBC 7.0  --  6.2   HGB 8.0* 6.5* 4.0*   HCT 27.3* 23.2* 16.8*     --  407   MCV 68.1*  --  57.5*   NA  --   --  138   K  --   --  3.9   CL  --   --  112*   CO2  --   --  22   BUN  --   --  5*   CREA  --   --  0.55*   CA  --   --  8.5   MG  --   --  2.5*   GLU  --   --  100   AP  --   --  71   AST  --   --  11*   ALT  --   --  12   TBILI  --   --  0.2   ALB  --   --  3.3*   TP  --   --  8.0   LPSE  --   --  54*      CXR 12/29/21 FINDINGS: Lung volumes are well inflated. Cardiac silhouette is borderline-enlarged. Otherwise mediastinal and hilar appear unremarkable. The lungs are clear. No acute osseous abnormalities are seen. IMPRESSION: No acute disease. Assessment:     Principal Problem:    Acute blood loss anemia (12/29/2021)    Active Problems:    Gastrointestinal bleed (12/29/2021)      Class 2 obesity due to excess calories without serious comorbidity with body mass index (BMI) of 37.0 to 37.9 in adult (12/30/2021)      27 y.o. female being seen in GI consult at the request of Dr. Xander Lynne for GI bleed after she presented to [facility] 12/29/21 with c/o fatigue, HA for several weeks, new onset shortness of breath, chest pain night of 12/28 with labs significant for severe microcytic anemia (HGb 4.0, MCV 57.5) with normal WBC, platelets, BUN/Cr, LFTs, Lipase. HS troponins have been elevated some at 43.6 and 42. CXR 12/29/21 was negative for acute findings. She reports minimal BRBPR on tissue when wiping after straining to pass stool 3/2021. No GI bleeding since and no GI complaints otherwise.  She reports frequent NSAID use (BC powders/Ibuprofen) around heavier menstrual cycles (last cycle early 12/2021) and even in between. No prior EGD or Colonoscopy. Plan:     -Microcytic anemia: Hgb improved s/p transfusion 2u pRBCs. Follow trend of H/H and consider additional transfusion pRBCs as needed. -Monitor for overt GI bleeding  -IV Protonix 40mg daily.  -Avoid NSAIDs. -NPO for EGD tomorrow to evaluate for erosive esophagitis, PUD. If EGD negative, may consider Colonoscopy evaluation. We have asked Cardiology to comment on elevated troponins, though understand could be related to demand in setting of severe anemia.  -Note plans for OBGYN consult for menorrhagia and symptomatic anemia. Follow recs. Further recommendations will be based upon findings on EGD, pt clinical course. Macey Herbert PA-C  [facility]    Patient is seen and examined in collaboration with Dr. Xiomy Gandara. Assessment and plan as per Dr. Xiomy Gandara. ATTENDING NOTE: I have seen the patient and agree with the above assessment and plan. Patient was admitted with symptomatic anemia and hemoglobin of 4. While this may represent heavy menstrual bleeding, significant NSAID use raises concern for peptic ulcer disease. Will keep NPO for EGD tomorrow. Her spouse Boby Leal) wishes to be notified with the results at [phone].     Layo Long MD

## 2021-12-30 NOTE — CONSULTS
[facility] Consult Note       Primary GI Physician: New to [facility]    Consulting GI Physician: Dr. Abdirizak Adorno    Referring Provider:  Dr. Jessica Nielson Date:  12/30/2021    Admit Date:  12/29/2021    Chief Complaint:  GI bleed    Subjective:     History of Present Illness:  Patient is a 27 y.o. female being seen in GI consult at the request of Dr. Hetal Mathis for GI bleed. She was admitted to [facility] 12/29/21 after she presented with complaints of fatigue, headaches for several weeks and new onset shortness of breath and chest pain the night before admission. Labs on admission revealed severe microcytic anemia with Hgb 4.0, MCV 57.5 with normal WBC, platelets, BUN/Cr, LFTs, Lipase. She has received 2u pRBCs with improvement to Hgb 8.0.  HS troponins have been elevated some at 43.6 and 42. CXR 12/29/21 was negative for acute findings. She denies known history of anemia. She denies use of oral or IV iron. She reports a history of heavy menstrual cycles. Last cycle was early 12/2021. She did see a small amount of red blood on the tissue when wiping 3/2021 and this was after straining to have a BM. She reports regular bowel patterns since then. She denies recurrent BRBPR and denies melena since then. She denies abdominal pain, N/V, reflux/heartburn, dysphagia, anorexia, unintentional weight loss. Now chest pain and shortness of breath are improved. She did have some weakness and this is improving as well. She reports frequent use of BC powders and Ibuprofen mostly around the time of her cycle but even in between. She denies tobacco or alcohol use. She denies prior EGD or Colonoscopy. PMH:  No past medical history on file. PSH:  No past surgical history on file. Allergies:  No Known Allergies    Home Medications:  Prior to Admission medications    Medication Sig Start Date End Date Taking?  Authorizing Provider   hydrocortisone (Anusol-HC) 25 mg supp Insert 1 Suppository into rectum two (2) times a day. Patient not taking: Reported on 12/29/2021 3/16/21   Frandy Collins MD       [facility] Medications:  Current Facility-Administered Medications   Medication Dose Route Frequency    sodium chloride (NS) flush 5-10 mL  5-10 mL IntraVENous Q8H    sodium chloride (NS) flush 5-10 mL  5-10 mL IntraVENous PRN    sodium chloride (NS) flush 5-40 mL  5-40 mL IntraVENous PRN    acetaminophen (TYLENOL) tablet 650 mg  650 mg Oral Q6H PRN    Or    acetaminophen (TYLENOL) suppository 650 mg  650 mg Rectal Q6H PRN    polyethylene glycol (MIRALAX) packet 17 g  17 g Oral DAILY PRN    ondansetron (ZOFRAN ODT) tablet 4 mg  4 mg Oral Q8H PRN    Or    ondansetron (ZOFRAN) injection 4 mg  4 mg IntraVENous Q6H PRN    influenza vaccine 2021-22 (6 mos+)(PF) (FLUARIX/FLULAVAL/FLUZONE QUAD) injection 0.5 mL  1 Each IntraMUSCular PRIOR TO DISCHARGE       Social History:  Social History     Tobacco Use    Smoking status: Never Smoker    Smokeless tobacco: Not on file   Substance Use Topics    Alcohol use: No       Family History:  Family History   Problem Relation Age of Onset    Heart Disease Mother      Per pt, aunt and some cousins had stomach cancer. No known Fhx colon cancer. Review of Systems:  A detailed 10 system ROS is obtained, with pertinent positives as listed above. All others are negative. Diet:  NPO    Objective:     Physical Exam:  Vitals:  Visit Vitals  /74 (BP 1 Location: Left upper arm, BP Patient Position: At rest)   Pulse 80   Temp 99 °F (37.2 °C)   Resp 18   Ht 5' 2\" (1.575 m)   Wt 93 kg (205 lb)   SpO2 99%   BMI 37.49 kg/m²     Gen:  Pt is alert, cooperative, no acute distress  Skin:  Face reveal no rashes. HEENT: Sclerae anicteric. Cardiovascular: Regular rate and rhythm. Respiratory:  Comfortable breathing with no accessory muscle use. Clear breath sounds anteriorly with no wheezes, rales, or rhonchi.   GI:  Abdomen obese, soft, and Nontender. Normal active bowel sounds. No masses palpable. Rectal:  Deferred  Musculoskeletal:  No pitting edema of the lower legs. Neurological:  Gross memory appears intact. Patient is alert and oriented. Psychiatric:  Mood appears appropriate with judgement intact. Laboratory:    Recent Labs     12/30/21  0710 12/29/21  2201 12/29/21  1236   WBC 7.0  --  6.2   HGB 8.0* 6.5* 4.0*   HCT 27.3* 23.2* 16.8*     --  407   MCV 68.1*  --  57.5*   NA  --   --  138   K  --   --  3.9   CL  --   --  112*   CO2  --   --  22   BUN  --   --  5*   CREA  --   --  0.55*   CA  --   --  8.5   MG  --   --  2.5*   GLU  --   --  100   AP  --   --  71   AST  --   --  11*   ALT  --   --  12   TBILI  --   --  0.2   ALB  --   --  3.3*   TP  --   --  8.0   LPSE  --   --  54*      CXR 12/29/21 FINDINGS: Lung volumes are well inflated. Cardiac silhouette is borderline-enlarged. Otherwise mediastinal and hilar appear unremarkable. The lungs are clear. No acute osseous abnormalities are seen. IMPRESSION: No acute disease. Assessment:     Principal Problem:    Acute blood loss anemia (12/29/2021)    Active Problems:    Gastrointestinal bleed (12/29/2021)      Class 2 obesity due to excess calories without serious comorbidity with body mass index (BMI) of 37.0 to 37.9 in adult (12/30/2021)      27 y.o. female being seen in GI consult at the request of Dr. Angie Ghosh for GI bleed after she presented to [facility] 12/29/21 with c/o fatigue, HA for several weeks, new onset shortness of breath, chest pain night of 12/28 with labs significant for severe microcytic anemia (HGb 4.0, MCV 57.5) with normal WBC, platelets, BUN/Cr, LFTs, Lipase. HS troponins have been elevated some at 43.6 and 42. CXR 12/29/21 was negative for acute findings. She reports minimal BRBPR on tissue when wiping after straining to pass stool 3/2021. No GI bleeding since and no GI complaints otherwise.  She reports frequent NSAID use (BC powders/Ibuprofen) around heavier menstrual cycles (last cycle early 12/2021) and even in between. No prior EGD or Colonoscopy. Plan:     -Microcytic anemia: Hgb improved s/p transfusion 2u pRBCs. Follow trend of H/H and consider additional transfusion pRBCs as needed. -Monitor for overt GI bleeding  -IV Protonix 40mg daily.  -Avoid NSAIDs. -NPO for EGD tomorrow to evaluate for erosive esophagitis, PUD. If EGD negative, may consider Colonoscopy evaluation. We have asked Cardiology to comment on elevated troponins, though understand could be related to demand in setting of severe anemia.  -Note plans for OBGYN consult for menorrhagia and symptomatic anemia. Follow recs. Further recommendations will be based upon findings on EGD, pt clinical course. Mireya Orellana PA-C  [facility]    Patient is seen and examined in collaboration with Dr. Meenakshi Stafford. Assessment and plan as per Dr. Meenakshi Stafford. ATTENDING NOTE: I have seen the patient and agree with the above assessment and plan. Patient was admitted with symptomatic anemia and hemoglobin of 4. While this may represent heavy menstrual bleeding, significant NSAID use raises concern for peptic ulcer disease. Will keep NPO for EGD tomorrow. Her spouse Ladfransisco Diaz) wishes to be notified with the results at [phone].     Erlin Cleary MD

## 2021-12-30 NOTE — PROGRESS NOTES
Hospitalist Progress Note   Admit Date:  2021  2:42 PM   Name:  Daryle Favorite   Age:  27 y.o. Sex:  female  :  1990   MRN:  [id number]   Room:  [facility]/    Presenting Complaint: Chest Pain    Reason(s) for Admission: Acute blood loss anemia [D62]  Gastrointestinal bleed [facility] Course & Interval History:   31F PMhx heavy menstrual cycles who presented  with fatigue and chest pain. She had always had heavy cycles and has not taken anything for it. She has noted several days of progressive weakness. She began having chest pain that was crushing last night. She has noted bleeding from her rectum. She denies n/v/d, difficulty breathing, syncope, swelling, vision changes. Her Hgb was <4 and transfusion was initiated in the ED. Hospitalist admitted. Subjective (21): Feeling well today since receiving transfusion. Gynecology has ordered ultrasound pelvis. Gastroenterology is preparing for EGD and possible colonoscopy. Hemoglobin has remained stable after transfusion. Assessment & Plan:   * Acute blood loss anemia  Admission Hgb <4, given pRBC 2U , 1U ;  -transfuse below 7  -serial Hgb    : Hgb 6.5, transfuse    Gastrointestinal bleed  -consult GI  -NPO     Menorrhagia with regular cycle  -consult gynecology    Class 2 obesity due to excess calories without serious comorbidity with body mass index (BMI) of 37.0 to 37.9 in adult  Increased risk of all cause mortality, complicating care  - healthy lifestyle at discharge    There is a high probability of acute organ impairment or life-threatening deterioration in the patient's condition from: acute blood loss anemia  Critical care interventions: blood transfusion  Total critical care time spent: 42 minutes. Time is indicative of direct patient attendance at bedside and on the patient's floor nearby.   Includes time spent at bedside performing history and exam, performing chart review, discussing findings and treatment plan with patient and/or family, discussing patient with consultants and colleagues, ordering and reviewing pertinent laboratory and radiographic evaluations, and discussing patient with nursing staff. Time excludes procedures.         Dispo/Discharge Planning:      Home pending clinical workup    Diet:  DIET NPO  DVT PPx: SCDs  Code status: Full Code    Hospital Problems as of 12/30/2021 Date Reviewed: 12/29/2021          Codes Class Noted - Resolved POA    * (Principal) Acute blood loss anemia ICD-10-CM: D62  ICD-9-CM: 285.1  12/29/2021 - Present Yes        Gastrointestinal bleed ICD-10-CM: K92.2  ICD-9-CM: 578.9  12/29/2021 - Present Yes        Class 2 obesity due to excess calories without serious comorbidity with body mass index (BMI) of 37.0 to 37.9 in adult (Chronic) ICD-10-CM: E66.09, Z68.37  ICD-9-CM: 278.00, V85.37  12/30/2021 - Present Yes              Objective:     Patient Vitals for the past 24 hrs:   Temp Pulse Resp BP SpO2   12/30/21 0804 98.2 °F (36.8 °C) 87 18 121/68 100 %   12/30/21 0613 99 °F (37.2 °C) 80 18 124/74 99 %   12/30/21 0535 98.9 °F (37.2 °C) 85 18 124/80 99 %   12/30/21 0430 98.8 °F (37.1 °C) 77 18 121/75 100 %   12/30/21 0235 98.4 °F (36.9 °C) 80 18 131/74 100 %   12/29/21 2307 99.5 °F (37.5 °C) 90 17 126/75 100 %   12/29/21 2235     100 %   12/29/21 2104 98.1 °F (36.7 °C) 90 18 127/69 100 %   12/29/21 1938 98.8 °F (37.1 °C) 87 16 (!) 142/64 100 %   12/29/21 1855 98.7 °F (37.1 °C) 88 12 115/63 100 %   12/29/21 1840 98.6 °F (37 °C) 88 16 123/64 100 %   12/29/21 1710 98.8 °F (37.1 °C)  18 135/61 100 %   12/29/21 1655 98.1 °F (36.7 °C) 98  126/73 100 %   12/29/21 1419 98.6 °F (37 °C) 96 16 128/68 100 %     Oxygen Therapy  O2 Sat (%): 100 % (12/30/21 0804)  Pulse via Oximetry: 89 beats per minute (12/29/21 2235)  O2 Device: None (Room air) (12/29/21 2235)    Estimated body mass index is 37.49 kg/m² as calculated from the following:    Height as of this encounter: 5' 2\" (1.575 m). Weight as of this encounter: 93 kg (205 lb). Intake/Output Summary (Last 24 hours) at 12/30/2021 0818  Last data filed at 12/30/2021 0618  Gross per 24 hour   Intake 1276.2 ml   Output    Net 1276.2 ml       Blood pressure 121/68, pulse 87, temperature 98.2 °F (36.8 °C), resp. rate 18, height 5' 2\" (1.575 m), weight 93 kg (205 lb), SpO2 100 %. Physical Exam  Nursing note reviewed. Constitutional:       General: She is not in acute distress. Appearance: Normal appearance. She is obese. Comments: affable   HENT:      Head: Normocephalic. Eyes:      Extraocular Movements: Extraocular movements intact. Cardiovascular:      Rate and Rhythm: Normal rate. Pulses:           Radial pulses are 2+ on the left side. Pulmonary:      Effort: Pulmonary effort is normal. No respiratory distress. Abdominal:      General: Bowel sounds are normal. There is no distension. Tenderness: There is no abdominal tenderness. Musculoskeletal:         General: No deformity. Cervical back: No rigidity. Skin:     General: Skin is warm and dry. Coloration: Skin is not pale. Neurological:      General: No focal deficit present. Mental Status: She is alert and oriented to person, place, and time.    Psychiatric:         Mood and Affect: Mood normal.         Behavior: Behavior normal.         I have reviewed ordered lab tests and independently visualized imaging below:    Recent Labs:  Recent Results (from the past 48 hour(s))   EKG, 12 LEAD, INITIAL    Collection Time: 12/29/21 12:30 PM   Result Value Ref Range    Ventricular Rate 100 BPM    Atrial Rate 100 BPM    P-R Interval 122 ms    QRS Duration 68 ms    Q-T Interval 384 ms    QTC Calculation (Bezet) 495 ms    Calculated P Axis 57 degrees    Calculated R Axis 65 degrees    Calculated T Axis 54 degrees    Diagnosis       Normal sinus rhythm  Prolonged QT  Abnormal ECG  No previous ECGs available  Confirmed by [address] [place] [address] Laron Oconnor MD (), DAHIANA MURIEL ([id number]) on 12/29/2021 4:04:08 PM     TROPONIN-HIGH SENSITIVITY    Collection Time: 12/29/21 12:36 PM   Result Value Ref Range    Troponin-High Sensitivity 43.6 (H) 0 - 14 pg/mL   CBC WITH AUTOMATED DIFF    Collection Time: 12/29/21 12:36 PM   Result Value Ref Range    WBC 6.2 4.3 - 11.1 K/uL    RBC 2.92 (L) 4.05 - 5.2 M/uL    HGB 4.0 (LL) 11.7 - 15.4 g/dL    HCT 16.8 (L) 35.8 - 46.3 %    MCV 57.5 (L) 79.6 - 97.8 FL    MCH 13.7 (L) 26.1 - 32.9 PG    MCHC 23.8 (L) 31.4 - 35.0 g/dL    RDW 22.5 (H) 11.9 - 14.6 %    PLATELET 251 560 - 024 K/uL    MPV Unable to calculate. Recommend adding IPF. 9.4 - 12.3 FL    ABSOLUTE NRBC 0.00 0.0 - 0.2 K/uL    DF AUTOMATED      NEUTROPHILS 54 43 - 78 %    LYMPHOCYTES 36 13 - 44 %    MONOCYTES 6 4.0 - 12.0 %    EOSINOPHILS 3 0.5 - 7.8 %    BASOPHILS 1 0.0 - 2.0 %    IMMATURE GRANULOCYTES 0 0.0 - 5.0 %    ABS. NEUTROPHILS 3.3 1.7 - 8.2 K/UL    ABS. LYMPHOCYTES 2.2 0.5 - 4.6 K/UL    ABS. MONOCYTES 0.3 0.1 - 1.3 K/UL    ABS. EOSINOPHILS 0.2 0.0 - 0.8 K/UL    ABS. BASOPHILS 0.0 0.0 - 0.2 K/UL    ABS. IMM. GRANS. 0.0 0.0 - 0.5 K/UL   METABOLIC PANEL, COMPREHENSIVE    Collection Time: 12/29/21 12:36 PM   Result Value Ref Range    Sodium 138 136 - 145 mmol/L    Potassium 3.9 3.5 - 5.1 mmol/L    Chloride 112 (H) 98 - 107 mmol/L    CO2 22 21 - 32 mmol/L    Anion gap 4 (L) 7 - 16 mmol/L    Glucose 100 65 - 100 mg/dL    BUN 5 (L) 6 - 23 MG/DL    Creatinine 0.55 (L) 0.6 - 1.0 MG/DL    GFR est AA >60 >60 ml/min/1.73m2    GFR est non-AA >60 >60 ml/min/1.73m2    Calcium 8.5 8.3 - 10.4 MG/DL    Bilirubin, total 0.2 0.2 - 1.1 MG/DL    ALT (SGPT) 12 12 - 65 U/L    AST (SGOT) 11 (L) 15 - 37 U/L    Alk.  phosphatase 71 50 - 136 U/L    Protein, total 8.0 6.3 - 8.2 g/dL    Albumin 3.3 (L) 3.5 - 5.0 g/dL    Globulin 4.7 (H) 2.3 - 3.5 g/dL    A-G Ratio 0.7 (L) 1.2 - 3.5     LIPASE    Collection Time: 12/29/21 12:36 PM   Result Value Ref Range    Lipase 54 (L) 73 - 393 U/L MAGNESIUM    Collection Time: 12/29/21 12:36 PM   Result Value Ref Range    Magnesium 2.5 (H) 1.8 - 2.4 mg/dL   COVID-19 RAPID TEST    Collection Time: 12/29/21  2:55 PM   Result Value Ref Range    Specimen source Nasopharyngeal      COVID-19 rapid test Not detected NOTD     TYPE & SCREEN    Collection Time: 12/29/21  3:00 PM   Result Value Ref Range    Crossmatch Expiration 01/01/2022,2359     ABO/Rh(D) A POSITIVE     Antibody screen NEG     Unit number [id number]     Blood component type  LR     Unit division 00     Status of unit TRANSFUSED     Crossmatch result Compatible     Unit number [id number]     Blood component type  LR     Unit division 00     Status of unit TRANSFUSED     Crossmatch result Compatible     Unit number [id number]     Blood component type  LR     Unit division 00     Status of unit ISSUED     Crossmatch result Compatible     Unit number [id number]     Blood component type  LR     Unit division 00     Status of unit ALLOCATED     Crossmatch result Compatible    RBC, ALLOCATE    Collection Time: 12/29/21  3:00 PM   Result Value Ref Range    HISTORY CHECKED? Historical check performed    TROPONIN-HIGH SENSITIVITY    Collection Time: 12/29/21  4:31 PM   Result Value Ref Range    Troponin-High Sensitivity 42.0 (H) 0 - 14 pg/mL   HGB & HCT    Collection Time: 12/29/21 10:01 PM   Result Value Ref Range    HGB 6.5 (LL) 11.7 - 15.4 g/dL    HCT 23.2 (L) 35.8 - 46.3 %   RBC, ALLOCATE    Collection Time: 12/29/21 11:45 PM   Result Value Ref Range    HISTORY CHECKED?  Historical check performed    PROTHROMBIN TIME + INR    Collection Time: 12/30/21  7:10 AM   Result Value Ref Range    Prothrombin time 14.4 12.6 - 14.5 sec    INR 1.1     METABOLIC PANEL, BASIC    Collection Time: 12/30/21  7:10 AM   Result Value Ref Range    Sodium 136 136 - 145 mmol/L    Potassium 3.7 3.5 - 5.1 mmol/L    Chloride 110 (H) 98 - 107 mmol/L    CO2 22 21 - 32 mmol/L    Anion gap 4 (L) 7 - 16 mmol/L Glucose 91 65 - 100 mg/dL    BUN 6 6 - 23 MG/DL    Creatinine 0.52 (L) 0.6 - 1.0 MG/DL    GFR est AA >60 >60 ml/min/1.73m2    GFR est non-AA >60 >60 ml/min/1.73m2    Calcium 8.9 8.3 - 10.4 MG/DL   MAGNESIUM    Collection Time: 12/30/21  7:10 AM   Result Value Ref Range    Magnesium 2.4 1.8 - 2.4 mg/dL   CBC W/O DIFF    Collection Time: 12/30/21  7:10 AM   Result Value Ref Range    WBC 7.0 4.3 - 11.1 K/uL    RBC 4.01 (L) 4.05 - 5.2 M/uL    HGB 8.0 (L) 11.7 - 15.4 g/dL    HCT 27.3 (L) 35.8 - 46.3 %    MCV 68.1 (L) 79.6 - 97.8 FL    MCH 20.0 (L) 26.1 - 32.9 PG    MCHC 29.3 (L) 31.4 - 35.0 g/dL    PLATELET 571 210 - 798 K/uL    MPV Unable to calculate. Recommend adding IPF. 9.4 - 12.3 FL    ABSOLUTE NRBC 0.00 0.0 - 0.2 K/uL       All Micro Results     Procedure Component Value Units Date/Time    COVID-19 RAPID TEST [[id number]] Collected: 12/29/21 1455    Order Status: Completed Specimen: Nasopharyngeal Updated: 12/29/21 1529     Specimen source Nasopharyngeal        COVID-19 rapid test Not detected        Comment:      The specimen is NEGATIVE for SARS-CoV-2, the novel coronavirus associated with COVID-19. A negative result does not rule out COVID-19. This test has been authorized by the FDA under an Emergency Use Authorization (EUA) for use by authorized laboratories. Fact sheet for Healthcare Providers: ConventionUpdate.co.nz  Fact sheet for Patients: ConventionUpdate.co.nz       Methodology: Isothermal Nucleic Acid Amplification               Other Studies:  XR CHEST PA LAT    Result Date: 12/29/2021  Chest 2 view CLINICAL INDICATION: Acute moderate shortness of breath and substernal chest tightness COMPARISON: Abdominal CT 11/12/2016 correlation TECHNIQUE: Upright PA  and lateral views of the chest FINDINGS: Lung volumes are well inflated. Cardiac silhouette is borderline-enlarged. Otherwise mediastinal and hilar appear unremarkable. The lungs are clear.   No acute osseous abnormalities are seen. No acute disease.        Current Meds:  Current Facility-Administered Medications   Medication Dose Route Frequency    sodium chloride (NS) flush 5-10 mL  5-10 mL IntraVENous Q8H    sodium chloride (NS) flush 5-10 mL  5-10 mL IntraVENous PRN    sodium chloride (NS) flush 5-40 mL  5-40 mL IntraVENous PRN    acetaminophen (TYLENOL) tablet 650 mg  650 mg Oral Q6H PRN    Or    acetaminophen (TYLENOL) suppository 650 mg  650 mg Rectal Q6H PRN    polyethylene glycol (MIRALAX) packet 17 g  17 g Oral DAILY PRN    ondansetron (ZOFRAN ODT) tablet 4 mg  4 mg Oral Q8H PRN    Or    ondansetron (ZOFRAN) injection 4 mg  4 mg IntraVENous Q6H PRN    influenza vaccine 2021-22 (6 mos+)(PF) (FLUARIX/FLULAVAL/FLUZONE QUAD) injection 0.5 mL  1 Each IntraMUSCular PRIOR TO DISCHARGE       Signed:  Guillermina Upton MD

## 2021-12-30 NOTE — PROGRESS NOTES
[facility] CARDIOLOGY History &Physical                   Subjective:     Patient is a 27 y.o. female with a hx of heavy menses who presented to the hospital with 2-3 day complaint of fatigue, dyspnea, and chest pain. Symptoms had been worsening prior to arrival. Chest pain described as a substernal heaviness, with fatigue and dyspnea associated. Patient denies GI bleeding, but does report that she has heavy menses. She was found to have profoundly low Hgb 4.0 on arrival. EKG sinus rhythm with prolonged QTc. She was transfused multiple units of PRBCs and has been admitted to the hospital floor. She has remained hemodynamically and electrically stable since admission, and reports that she is feeling much better since transfusions. No further chest pain, dyspnea. Fatigue improved. Denies abdominal pain, leg swelling, nausea or vomiting. She is otherwise without acute complaints at this time. Symptoms greatly improved with inpatient treatment, planning for EGD with GI. Lab Results   Component Value Date     12/30/2021    K 3.7 12/30/2021    MG 2.4 12/30/2021    BUN 6 12/30/2021    CREA 0.52 (L) 12/30/2021    WBC 7.0 12/30/2021    HGB 8.3 (L) 12/30/2021     12/30/2021    INR 1.1 12/30/2021    TSH 3.020 12/30/2021        Past Medical History:   Diagnosis Date    Abnormal uterine bleeding (AUB) 12/30/2021      No past surgical history on file. Family History   Problem Relation Age of Onset    Heart Disease Mother       Social History     Tobacco Use    Smoking status: Never Smoker    Smokeless tobacco: Not on file   Substance Use Topics    Alcohol use: No      No Known Allergies      Review of Systems   Constitutional: Positive for malaise/fatigue. HENT: Negative. Eyes: Negative. Cardiovascular: Positive for chest pain. Negative for dyspnea on exertion, irregular heartbeat, leg swelling, near-syncope, palpitations, paroxysmal nocturnal dyspnea and syncope.    Respiratory: Positive for shortness of breath. Endocrine: Negative. Hematologic/Lymphatic: Positive for bleeding problem. Skin: Negative. Musculoskeletal: Negative. Gastrointestinal: Negative. Negative for hematemesis and hematochezia. Genitourinary: Positive for menorrhagia. Allergic/Immunologic: Negative. All other systems reviewed and are negative. Objective:       Visit Vitals  BP (!) 105/57   Pulse 86   Temp 98.6 °F (37 °C)   Resp 18   Ht 5' 2\" (1.575 m)   Wt 205 lb (93 kg)   SpO2 100%   BMI 37.49 kg/m²       No intake/output data recorded. 12/28 1901 - 12/30 0700  In: 1276.2 [I.V.:308]  Out: -     Physical Exam  Vitals reviewed. Constitutional:       General: She is not in acute distress. Appearance: Normal appearance. She is well-developed. She is obese. She is not ill-appearing, toxic-appearing or diaphoretic. HENT:      Head: Normocephalic and atraumatic. Nose: Nose normal.   Eyes:      General: Lids are normal. No scleral icterus. Right eye: No discharge. Left eye: No discharge. Comments: Scleral pallor. Neck:      Vascular: No JVD. Trachea: No tracheal deviation. Cardiovascular:      Rate and Rhythm: Normal rate and regular rhythm. Pulses:           Radial pulses are 2+ on the right side and 2+ on the left side. Heart sounds: Normal heart sounds, S1 normal and S2 normal. No murmur heard. No gallop. Pulmonary:      Effort: Pulmonary effort is normal.      Breath sounds: Normal breath sounds. No wheezing or rales. Abdominal:      General: There is no distension. Palpations: Abdomen is soft. Musculoskeletal:         General: Normal range of motion. Cervical back: Normal range of motion. Right lower leg: No edema. Left lower leg: No edema. Skin:     General: Skin is warm and dry. Coloration: Skin is not jaundiced or pale. Neurological:      Mental Status: She is alert and oriented to person, place, and time. Psychiatric:         Mood and Affect: Mood normal.         Thought Content: Thought content normal.         Judgment: Judgment normal.       Data Review:   Recent Labs     12/30/21  1337 12/30/21  0710 12/29/21  2201 12/29/21  1236   NA  --  136  --  138   K  --  3.7  --  3.9   MG  --  2.4  --  2.5*   BUN  --  6  --  5*   CREA  --  0.52*  --  0.55*   GLU  --  91  --  100   WBC  --  7.0  --  6.2   HGB 8.3* 8.0*   < > 4.0*   HCT 28.6* 27.3*   < > 16.8*   PLT  --  323  --  407   INR  --  1.1  --   --     < > = values in this interval not displayed. CXR Results  (Last 48 hours)               12/29/21 1444  XR CHEST PA LAT Final result    Impression:  No acute disease. Narrative:  Chest 2 view       CLINICAL INDICATION: Acute moderate shortness of breath and substernal chest   tightness       COMPARISON: Abdominal CT 11/12/2016 correlation       TECHNIQUE: Upright PA  and lateral views of the chest        FINDINGS: Lung volumes are well inflated. Cardiac silhouette is   borderline-enlarged. Otherwise mediastinal and hilar appear unremarkable. The   lungs are clear. No acute osseous abnormalities are seen.                      Assessment/Plan:   Principal Problem:    Acute blood loss anemia (12/29/2021)    Menorrhagia with regular cycle (12/30/2021)    Gastrointestinal bleed (12/29/2021)    Abnormal uterine bleeding (AUB) (12/30/2021)    - seen by GI with plans for EGD     - seen by GYN     - EGD is a low risk procedure    - initial EKG sinus with prolonged QTc.    - repeat EKG today personally reviewed demonstrates sinus rhythm , rate 85 BPM,  msec     - no indication for further cardiac testing prior to EGD , avoid QT prolonging medications as possible     Active Problems:    Chest pain    - resolved    - chest pain and elevated troponin due to supply/demand mismatch from severely low Hgb (4.0 on arrival)    - symptoms resolved since transfusions given    - patient is hemodynamically and electrically stable       Class 2 obesity due to excess calories without serious comorbidity with body mass index (BMI) of 37.0 to 37.9 in adult (12/30/2021)  - continue efforts towards dietary changes in an effort to lose weight    Will be on stand by, please call with questions.      Anni Machado DO  12/30/2021  3:38 PM

## 2021-12-30 NOTE — PROGRESS NOTES
END OF SHIFT    Pelvic US completed. EGD planned for 12/31 consent in chart. Pt will be NPO at midnight. Report given to oncoming RN.

## 2021-12-31 ENCOUNTER — ANESTHESIA (OUTPATIENT)
Dept: ENDOSCOPY | Age: 31
DRG: 812 | End: 2021-12-31

## 2021-12-31 LAB
ANION GAP SERPL CALC-SCNC: 3 MMOL/L (ref 7–16)
ATRIAL RATE: 100 BPM
BUN SERPL-MCNC: 9 MG/DL (ref 6–23)
CALCIUM SERPL-MCNC: 8.8 MG/DL (ref 8.3–10.4)
CALCULATED P AXIS, ECG09: 57 DEGREES
CALCULATED R AXIS, ECG10: 65 DEGREES
CALCULATED T AXIS, ECG11: 54 DEGREES
CHLORIDE SERPL-SCNC: 112 MMOL/L (ref 98–107)
CO2 SERPL-SCNC: 22 MMOL/L (ref 21–32)
CREAT SERPL-MCNC: 0.57 MG/DL (ref 0.6–1)
DIAGNOSIS, 93000: NORMAL
ERYTHROCYTE [DISTWIDTH] IN BLOOD BY AUTOMATED COUNT: 32.8 % (ref 11.9–14.6)
GLUCOSE SERPL-MCNC: 107 MG/DL (ref 65–100)
HCG UR QL: NEGATIVE
HCT VFR BLD AUTO: 27.8 % (ref 35.8–46.3)
HGB BLD-MCNC: 7.8 G/DL (ref 11.7–15.4)
MAGNESIUM SERPL-MCNC: 2.4 MG/DL (ref 1.8–2.4)
MCH RBC QN AUTO: 19.5 PG (ref 26.1–32.9)
MCHC RBC AUTO-ENTMCNC: 28.1 G/DL (ref 31.4–35)
MCV RBC AUTO: 69.5 FL (ref 79.6–97.8)
NRBC # BLD: 0 K/UL (ref 0–0.2)
P-R INTERVAL, ECG05: 122 MS
PLATELET # BLD AUTO: 339 K/UL (ref 150–450)
PMV BLD AUTO: ABNORMAL FL (ref 9.4–12.3)
POTASSIUM SERPL-SCNC: 4 MMOL/L (ref 3.5–5.1)
Q-T INTERVAL, ECG07: 384 MS
QRS DURATION, ECG06: 68 MS
QTC CALCULATION (BEZET), ECG08: 495 MS
RBC # BLD AUTO: 4 M/UL (ref 4.05–5.2)
SODIUM SERPL-SCNC: 137 MMOL/L (ref 136–145)
VENTRICULAR RATE, ECG03: 100 BPM
WBC # BLD AUTO: 8.1 K/UL (ref 4.3–11.1)

## 2021-12-31 PROCEDURE — 74011250637 HC RX REV CODE- 250/637: Performed by: FAMILY MEDICINE

## 2021-12-31 PROCEDURE — C9113 INJ PANTOPRAZOLE SODIUM, VIA: HCPCS | Performed by: PHYSICIAN ASSISTANT

## 2021-12-31 PROCEDURE — 0DJ08ZZ INSPECTION OF UPPER INTESTINAL TRACT, VIA NATURAL OR ARTIFICIAL OPENING ENDOSCOPIC: ICD-10-PCS | Performed by: INTERNAL MEDICINE

## 2021-12-31 PROCEDURE — 36415 COLL VENOUS BLD VENIPUNCTURE: CPT

## 2021-12-31 PROCEDURE — 2709999900 HC NON-CHARGEABLE SUPPLY: Performed by: INTERNAL MEDICINE

## 2021-12-31 PROCEDURE — 80048 BASIC METABOLIC PNL TOTAL CA: CPT

## 2021-12-31 PROCEDURE — 76040000025: Performed by: INTERNAL MEDICINE

## 2021-12-31 PROCEDURE — 65270000029 HC RM PRIVATE

## 2021-12-31 PROCEDURE — 81025 URINE PREGNANCY TEST: CPT

## 2021-12-31 PROCEDURE — 74011250636 HC RX REV CODE- 250/636: Performed by: ANESTHESIOLOGY

## 2021-12-31 PROCEDURE — 74011250636 HC RX REV CODE- 250/636: Performed by: NURSE ANESTHETIST, CERTIFIED REGISTERED

## 2021-12-31 PROCEDURE — 85027 COMPLETE CBC AUTOMATED: CPT

## 2021-12-31 PROCEDURE — 83735 ASSAY OF MAGNESIUM: CPT

## 2021-12-31 PROCEDURE — 76060000032 HC ANESTHESIA 0.5 TO 1 HR: Performed by: INTERNAL MEDICINE

## 2021-12-31 PROCEDURE — 74011000250 HC RX REV CODE- 250: Performed by: PHYSICIAN ASSISTANT

## 2021-12-31 PROCEDURE — 74011250636 HC RX REV CODE- 250/636: Performed by: PHYSICIAN ASSISTANT

## 2021-12-31 RX ORDER — OXYCODONE HYDROCHLORIDE 5 MG/1
5 TABLET ORAL
Status: DISCONTINUED | OUTPATIENT
Start: 2021-12-31 | End: 2021-12-31 | Stop reason: HOSPADM

## 2021-12-31 RX ORDER — PANTOPRAZOLE SODIUM 40 MG/1
40 TABLET, DELAYED RELEASE ORAL
Status: DISCONTINUED | OUTPATIENT
Start: 2022-01-01 | End: 2022-01-01 | Stop reason: HOSPADM

## 2021-12-31 RX ORDER — MIDAZOLAM HYDROCHLORIDE 1 MG/ML
2 INJECTION, SOLUTION INTRAMUSCULAR; INTRAVENOUS ONCE
Status: DISCONTINUED | OUTPATIENT
Start: 2021-12-31 | End: 2021-12-31 | Stop reason: HOSPADM

## 2021-12-31 RX ORDER — SODIUM CHLORIDE, SODIUM LACTATE, POTASSIUM CHLORIDE, CALCIUM CHLORIDE 600; 310; 30; 20 MG/100ML; MG/100ML; MG/100ML; MG/100ML
100 INJECTION, SOLUTION INTRAVENOUS CONTINUOUS
Status: DISCONTINUED | OUTPATIENT
Start: 2021-12-31 | End: 2021-12-31 | Stop reason: HOSPADM

## 2021-12-31 RX ORDER — PROPOFOL 10 MG/ML
INJECTION, EMULSION INTRAVENOUS AS NEEDED
Status: DISCONTINUED | OUTPATIENT
Start: 2021-12-31 | End: 2021-12-31 | Stop reason: HOSPADM

## 2021-12-31 RX ORDER — DIPHENHYDRAMINE HYDROCHLORIDE 50 MG/ML
12.5 INJECTION, SOLUTION INTRAMUSCULAR; INTRAVENOUS
Status: DISCONTINUED | OUTPATIENT
Start: 2021-12-31 | End: 2021-12-31 | Stop reason: HOSPADM

## 2021-12-31 RX ORDER — OXYCODONE HYDROCHLORIDE 5 MG/1
10 TABLET ORAL
Status: DISCONTINUED | OUTPATIENT
Start: 2021-12-31 | End: 2021-12-31 | Stop reason: HOSPADM

## 2021-12-31 RX ORDER — MIDAZOLAM HYDROCHLORIDE 1 MG/ML
2 INJECTION, SOLUTION INTRAMUSCULAR; INTRAVENOUS
Status: DISCONTINUED | OUTPATIENT
Start: 2021-12-31 | End: 2021-12-31 | Stop reason: HOSPADM

## 2021-12-31 RX ORDER — NALOXONE HYDROCHLORIDE 0.4 MG/ML
0.1 INJECTION, SOLUTION INTRAMUSCULAR; INTRAVENOUS; SUBCUTANEOUS AS NEEDED
Status: DISCONTINUED | OUTPATIENT
Start: 2021-12-31 | End: 2021-12-31 | Stop reason: HOSPADM

## 2021-12-31 RX ORDER — LIDOCAINE HYDROCHLORIDE 10 MG/ML
0.1 INJECTION INFILTRATION; PERINEURAL AS NEEDED
Status: DISCONTINUED | OUTPATIENT
Start: 2021-12-31 | End: 2021-12-31 | Stop reason: HOSPADM

## 2021-12-31 RX ORDER — FENTANYL CITRATE 50 UG/ML
100 INJECTION, SOLUTION INTRAMUSCULAR; INTRAVENOUS ONCE
Status: DISCONTINUED | OUTPATIENT
Start: 2021-12-31 | End: 2021-12-31 | Stop reason: HOSPADM

## 2021-12-31 RX ORDER — HYDROMORPHONE HYDROCHLORIDE 2 MG/ML
0.5 INJECTION, SOLUTION INTRAMUSCULAR; INTRAVENOUS; SUBCUTANEOUS
Status: DISCONTINUED | OUTPATIENT
Start: 2021-12-31 | End: 2021-12-31 | Stop reason: HOSPADM

## 2021-12-31 RX ORDER — ALBUTEROL SULFATE 0.83 MG/ML
2.5 SOLUTION RESPIRATORY (INHALATION) AS NEEDED
Status: DISCONTINUED | OUTPATIENT
Start: 2021-12-31 | End: 2021-12-31 | Stop reason: HOSPADM

## 2021-12-31 RX ORDER — ONDANSETRON 2 MG/ML
4 INJECTION INTRAMUSCULAR; INTRAVENOUS ONCE
Status: DISCONTINUED | OUTPATIENT
Start: 2021-12-31 | End: 2021-12-31 | Stop reason: HOSPADM

## 2021-12-31 RX ADMIN — ACETAMINOPHEN 650 MG: 325 TABLET, FILM COATED ORAL at 22:53

## 2021-12-31 RX ADMIN — PROPOFOL 150 MG: 10 INJECTION, EMULSION INTRAVENOUS at 12:28

## 2021-12-31 RX ADMIN — Medication 10 ML: at 05:08

## 2021-12-31 RX ADMIN — PROPOFOL 50 MG: 10 INJECTION, EMULSION INTRAVENOUS at 12:36

## 2021-12-31 RX ADMIN — Medication 10 ML: at 15:13

## 2021-12-31 RX ADMIN — PROPOFOL 50 MG: 10 INJECTION, EMULSION INTRAVENOUS at 12:32

## 2021-12-31 RX ADMIN — Medication 10 ML: at 21:50

## 2021-12-31 RX ADMIN — SODIUM CHLORIDE, SODIUM LACTATE, POTASSIUM CHLORIDE, AND CALCIUM CHLORIDE 100 ML/HR: 600; 310; 30; 20 INJECTION, SOLUTION INTRAVENOUS at 12:00

## 2021-12-31 RX ADMIN — SODIUM CHLORIDE 40 MG: 9 INJECTION INTRAMUSCULAR; INTRAVENOUS; SUBCUTANEOUS at 08:08

## 2021-12-31 NOTE — PROCEDURES
PROCEDURE: Esophagogastroduodenoscopy    ENDOSCOPIST: Shama Torrez M.D. PREOPERATIVE DIAGNOSIS: suspected GI bleed    POSTOPERATIVE DIAGNOSIS: Duodenitis    INSTRUMENTS: [id number]    SEDATION: MAC    DESCRIPTION: After informed consent was obtained, the patient was taken to the endoscopy suite and placed in the left lateral decubitus position. Intravenous sedation was administered by the Anesthesia provider. After adequate sedation had been achieved the endoscope was inserted over the tongue and through the posterior pharynx under direct visualization down the esophagus to the stomach and into the second portion of the duodenum. The endoscopic was withdrawn from that point, performing a careful survey of the mucosa. Retroflexion was performed in the gastric fundus. FINDINGS:  Esophagus: Normal    Stomach: Normal    Duodenum: Few erosions and friability noted in bulb. D2 and D3 normal    Estimated blood loss:  0 cc-minimal    IMPRESSION: Mild peptic duodenitis. No evidence of upper GI bleed    PLAN: Advance diet. Unclear if any further GI role at this point; will defer to Dr. Brendon Virgen.       Mónica Goodwin MD

## 2021-12-31 NOTE — ANESTHESIA POSTPROCEDURE EVALUATION
Procedure(s):  ESOPHAGOGASTRODUODENOSCOPY (EGD) ROOM 367 ES. total IV anesthesia    Anesthesia Post Evaluation      Multimodal analgesia: multimodal analgesia used between 6 hours prior to anesthesia start to PACU discharge  Patient location during evaluation: PACU  Patient participation: complete - patient participated  Level of consciousness: awake and alert  Pain score: 0  Pain management: adequate  Airway patency: patent  Anesthetic complications: no  Cardiovascular status: acceptable and hemodynamically stable  Respiratory status: acceptable and spontaneous ventilation  Hydration status: acceptable  Post anesthesia nausea and vomiting:  none  Final Post Anesthesia Temperature Assessment:  Normothermia (36.0-37.5 degrees C)      INITIAL Post-op Vital signs:   Vitals Value Taken Time   /57 12/31/21 1250   Temp 37 °C (98.6 °F) 12/31/21 1249   Pulse 67 12/31/21 1253   Resp 21 12/31/21 1253   SpO2 97 % 12/31/21 1253   Vitals shown include unvalidated device data.

## 2021-12-31 NOTE — PROGRESS NOTES
GI/Dr. Alia Greer    Pt was called and spoke to spouse, BODØ. Advised that results of EGD were reviewed by myself and Dr. Alia Greer. Recommended plan for f/u CBC as outpatient in 1 week as well as outpatient Colonoscopy. Advised that our office will call early next week to arrange above studies. Pt spouse expressed understanding and agreed with plan. I am updating hospitalist of plan. Silvina Chase PA-C  [facility]    ATTENDING NOTE:  I agree with the above assessment and plan.     Murali Finnegan MD

## 2021-12-31 NOTE — PROGRESS NOTES
Hospitalist Progress Note   Admit Date:  2021  2:42 PM   Name:  Antonia Steel   Age:  27 y.o. Sex:  female  :  1990   MRN:  [id number]   Room:  /    Presenting Complaint: Chest Pain    Reason(s) for Admission: Acute blood loss anemia [D62]  Gastrointestinal bleed [facility] Course & Interval History:   31F PMhx heavy menstrual cycles who presented  with fatigue and chest pain. She had always had heavy cycles and has not taken anything for it. She has noted several days of progressive weakness. She began having chest pain that was crushing last night. She has noted bleeding from her rectum. She denies n/v/d, difficulty breathing, syncope, swelling, vision changes. Her Hgb was <4 and transfusion was initiated in the ED. Hospitalist admitted. Transfuse pRBC 2U , 1U . Gynecology and gastroenterology consulted. TV US  with 2 intrauterine masses. EGD . .. Subjective (21): New findings on ultrasound consistent with leiomyoma x2. GYN with plan for OCP and outpatient follow-up. Hemoglobin stable today. Plan for EGD possible colonoscopy today. Patient hungry but otherwise without new medical complaints.     Assessment & Plan:   * Acute blood loss anemia  Admission Hgb <4, given pRBC 2U , 1U ;  -transfuse below 7  -serial Hgb    : Hgb >7, continue to monitor    Gastrointestinal bleed  -consult GI  -NPO     : EGD and possible colonoscopy today    Menorrhagia with regular cycle  -consult gynecology    Abnormal uterine bleeding (AUB)  TV ultrasound with 2 intrauterine masses: 6.2 x 5.1 x 5.6 & 2.6 x 2.5 x 2.5 cm  : Seen by Dr. Ivis Youngblood from GYN today, recommends OCP and OP FU    Class 2 obesity due to excess calories without serious comorbidity with body mass index (BMI) of 37.0 to 37.9 in adult  Increased risk of all cause mortality, complicating care  - healthy lifestyle at discharge      Dispo/Discharge Planning: Home pending clinical workup    Diet:  DIET NPO  DVT PPx: SCDs  Code status: Full Code    Hospital Problems as of 12/31/2021 Date Reviewed: 12/30/2021          Codes Class Noted - Resolved POA    * (Principal) Acute blood loss anemia ICD-10-CM: D62  ICD-9-CM: 285.1  12/29/2021 - Present Yes        Gastrointestinal bleed ICD-10-CM: K92.2  ICD-9-CM: 578.9  12/29/2021 - Present Yes        Menorrhagia with regular cycle (Chronic) ICD-10-CM: N92.0  ICD-9-CM: 626.2  12/30/2021 - Present Yes        Abnormal uterine bleeding (AUB) ICD-10-CM: N93.9  ICD-9-CM: 626.9  12/30/2021 - Present Yes        Class 2 obesity due to excess calories without serious comorbidity with body mass index (BMI) of 37.0 to 37.9 in adult (Chronic) ICD-10-CM: E66.09, Z68.37  ICD-9-CM: 278.00, V85.37  12/30/2021 - Present Yes              Objective:     Patient Vitals for the past 24 hrs:   Temp Pulse Resp BP SpO2   12/31/21 0800 98.1 °F (36.7 °C) 68 18 109/62 99 %   12/31/21 0254 98.2 °F (36.8 °C) 81 20 132/77 99 %   12/30/21 2340 98.8 °F (37.1 °C) 82 18 125/76 100 %   12/30/21 1947 98.6 °F (37 °C) 76 18 121/70 100 %   12/30/21 1937     100 %   12/30/21 1613     100 %   12/30/21 1543 98.6 °F (37 °C) 86 18 (!) 105/57 100 %     Oxygen Therapy  O2 Sat (%): 99 % (12/31/21 0800)  Pulse via Oximetry: 102 beats per minute (12/30/21 1613)  O2 Device: None (Room air) (12/30/21 1947)    Estimated body mass index is 37.49 kg/m² as calculated from the following:    Height as of this encounter: 5' 2\" (1.575 m). Weight as of this encounter: 93 kg (205 lb). Intake/Output Summary (Last 24 hours) at 12/31/2021 1209  Last data filed at 12/30/2021 2100  Gross per 24 hour   Intake 240 ml   Output    Net 240 ml       Blood pressure 109/62, pulse 68, temperature 98.1 °F (36.7 °C), resp. rate 18, height 5' 2\" (1.575 m), weight 93 kg (205 lb), SpO2 99 %. Physical Exam  Nursing note reviewed. Constitutional:       General: She is not in acute distress. Appearance: Normal appearance. She is obese. Comments: affable   HENT:      Head: Normocephalic. Eyes:      Extraocular Movements: Extraocular movements intact. Cardiovascular:      Rate and Rhythm: Normal rate. Pulses:           Radial pulses are 2+ on the left side. Pulmonary:      Effort: Pulmonary effort is normal. No respiratory distress. Abdominal:      General: Bowel sounds are normal. There is no distension. Tenderness: There is no abdominal tenderness. Musculoskeletal:         General: No deformity. Cervical back: No rigidity. Skin:     General: Skin is warm and dry. Coloration: Skin is not pale. Neurological:      General: No focal deficit present. Mental Status: She is alert and oriented to person, place, and time.    Psychiatric:         Mood and Affect: Mood normal.         Behavior: Behavior normal.         I have reviewed ordered lab tests and independently visualized imaging below:    Recent Labs:  Recent Results (from the past 48 hour(s))   EKG, 12 LEAD, INITIAL    Collection Time: 12/29/21 12:30 PM   Result Value Ref Range    Ventricular Rate 100 BPM    Atrial Rate 100 BPM    P-R Interval 122 ms    QRS Duration 68 ms    Q-T Interval 384 ms    QTC Calculation (Bezet) 495 ms    Calculated P Axis 57 degrees    Calculated R Axis 65 degrees    Calculated T Axis 54 degrees    Diagnosis       Normal sinus rhythm  Prolonged QT  Abnormal ECG  No previous ECGs available  Confirmed by ST CAMMIE LARSON MD (), DAHIANA LLAMAS ([id number]) on 12/29/2021 4:04:08 PM     EKG, 12 LEAD, SUBSEQUENT    Collection Time: 12/29/21 12:30 PM   Result Value Ref Range    Ventricular Rate 100 BPM    Atrial Rate 100 BPM    P-R Interval 122 ms    QRS Duration 68 ms    Q-T Interval 384 ms    QTC Calculation (Bezet) 495 ms    Calculated P Axis 57 degrees    Calculated R Axis 65 degrees    Calculated T Axis 54 degrees    Diagnosis       Normal sinus rhythm  Prolonged QT  Abnormal ECG  No previous ECGs available  Confirmed by Alice Angela ([id number]) on 12/31/2021 7:50:36 AM     TROPONIN-HIGH SENSITIVITY    Collection Time: 12/29/21 12:36 PM   Result Value Ref Range    Troponin-High Sensitivity 43.6 (H) 0 - 14 pg/mL   CBC WITH AUTOMATED DIFF    Collection Time: 12/29/21 12:36 PM   Result Value Ref Range    WBC 6.2 4.3 - 11.1 K/uL    RBC 2.92 (L) 4.05 - 5.2 M/uL    HGB 4.0 (LL) 11.7 - 15.4 g/dL    HCT 16.8 (L) 35.8 - 46.3 %    MCV 57.5 (L) 79.6 - 97.8 FL    MCH 13.7 (L) 26.1 - 32.9 PG    MCHC 23.8 (L) 31.4 - 35.0 g/dL    RDW 22.5 (H) 11.9 - 14.6 %    PLATELET 504 194 - 411 K/uL    MPV Unable to calculate. Recommend adding IPF. 9.4 - 12.3 FL    ABSOLUTE NRBC 0.00 0.0 - 0.2 K/uL    DF AUTOMATED      NEUTROPHILS 54 43 - 78 %    LYMPHOCYTES 36 13 - 44 %    MONOCYTES 6 4.0 - 12.0 %    EOSINOPHILS 3 0.5 - 7.8 %    BASOPHILS 1 0.0 - 2.0 %    IMMATURE GRANULOCYTES 0 0.0 - 5.0 %    ABS. NEUTROPHILS 3.3 1.7 - 8.2 K/UL    ABS. LYMPHOCYTES 2.2 0.5 - 4.6 K/UL    ABS. MONOCYTES 0.3 0.1 - 1.3 K/UL    ABS. EOSINOPHILS 0.2 0.0 - 0.8 K/UL    ABS. BASOPHILS 0.0 0.0 - 0.2 K/UL    ABS. IMM. GRANS. 0.0 0.0 - 0.5 K/UL   METABOLIC PANEL, COMPREHENSIVE    Collection Time: 12/29/21 12:36 PM   Result Value Ref Range    Sodium 138 136 - 145 mmol/L    Potassium 3.9 3.5 - 5.1 mmol/L    Chloride 112 (H) 98 - 107 mmol/L    CO2 22 21 - 32 mmol/L    Anion gap 4 (L) 7 - 16 mmol/L    Glucose 100 65 - 100 mg/dL    BUN 5 (L) 6 - 23 MG/DL    Creatinine 0.55 (L) 0.6 - 1.0 MG/DL    GFR est AA >60 >60 ml/min/1.73m2    GFR est non-AA >60 >60 ml/min/1.73m2    Calcium 8.5 8.3 - 10.4 MG/DL    Bilirubin, total 0.2 0.2 - 1.1 MG/DL    ALT (SGPT) 12 12 - 65 U/L    AST (SGOT) 11 (L) 15 - 37 U/L    Alk.  phosphatase 71 50 - 136 U/L    Protein, total 8.0 6.3 - 8.2 g/dL    Albumin 3.3 (L) 3.5 - 5.0 g/dL    Globulin 4.7 (H) 2.3 - 3.5 g/dL    A-G Ratio 0.7 (L) 1.2 - 3.5     LIPASE    Collection Time: 12/29/21 12:36 PM   Result Value Ref Range    Lipase 54 (L) 73 - 393 U/L   MAGNESIUM    Collection Time: 12/29/21 12:36 PM   Result Value Ref Range    Magnesium 2.5 (H) 1.8 - 2.4 mg/dL   COVID-19 RAPID TEST    Collection Time: 12/29/21  2:55 PM   Result Value Ref Range    Specimen source Nasopharyngeal      COVID-19 rapid test Not detected NOTD     TYPE & SCREEN    Collection Time: 12/29/21  3:00 PM   Result Value Ref Range    Crossmatch Expiration 01/01/2022,2359     ABO/Rh(D) A POSITIVE     Antibody screen NEG     Unit number [id number]     Blood component type  LR     Unit division 00     Status of unit TRANSFUSED     Crossmatch result Compatible     Unit number [id number]     Blood component type  LR     Unit division 00     Status of unit TRANSFUSED     Crossmatch result Compatible     Unit number [id number]     Blood component type  LR     Unit division 00     Status of unit TRANSFUSED     Crossmatch result Compatible     Unit number [id number]     Blood component type  LR     Unit division 00     Status of unit ALLOCATED     Crossmatch result Compatible    RBC, ALLOCATE    Collection Time: 12/29/21  3:00 PM   Result Value Ref Range    HISTORY CHECKED? Historical check performed    TROPONIN-HIGH SENSITIVITY    Collection Time: 12/29/21  4:31 PM   Result Value Ref Range    Troponin-High Sensitivity 42.0 (H) 0 - 14 pg/mL   HGB & HCT    Collection Time: 12/29/21 10:01 PM   Result Value Ref Range    HGB 6.5 (LL) 11.7 - 15.4 g/dL    HCT 23.2 (L) 35.8 - 46.3 %   RBC, ALLOCATE    Collection Time: 12/29/21 11:45 PM   Result Value Ref Range    HISTORY CHECKED?  Historical check performed    PROTHROMBIN TIME + INR    Collection Time: 12/30/21  7:10 AM   Result Value Ref Range    Prothrombin time 14.4 12.6 - 14.5 sec    INR 1.1     METABOLIC PANEL, BASIC    Collection Time: 12/30/21  7:10 AM   Result Value Ref Range    Sodium 136 136 - 145 mmol/L    Potassium 3.7 3.5 - 5.1 mmol/L    Chloride 110 (H) 98 - 107 mmol/L    CO2 22 21 - 32 mmol/L    Anion gap 4 (L) 7 - 16 mmol/L    Glucose 91 65 - 100 mg/dL    BUN 6 6 - 23 MG/DL    Creatinine 0.52 (L) 0.6 - 1.0 MG/DL    GFR est AA >60 >60 ml/min/1.73m2    GFR est non-AA >60 >60 ml/min/1.73m2    Calcium 8.9 8.3 - 10.4 MG/DL   MAGNESIUM    Collection Time: 12/30/21  7:10 AM   Result Value Ref Range    Magnesium 2.4 1.8 - 2.4 mg/dL   CBC W/O DIFF    Collection Time: 12/30/21  7:10 AM   Result Value Ref Range    WBC 7.0 4.3 - 11.1 K/uL    RBC 4.01 (L) 4.05 - 5.2 M/uL    HGB 8.0 (L) 11.7 - 15.4 g/dL    HCT 27.3 (L) 35.8 - 46.3 %    MCV 68.1 (L) 79.6 - 97.8 FL    MCH 20.0 (L) 26.1 - 32.9 PG    MCHC 29.3 (L) 31.4 - 35.0 g/dL    PLATELET 953 769 - 219 K/uL    MPV Unable to calculate. Recommend adding IPF. 9.4 - 12.3 FL    ABSOLUTE NRBC 0.00 0.0 - 0.2 K/uL   TSH 3RD GENERATION    Collection Time: 12/30/21  7:10 AM   Result Value Ref Range    TSH 3.020 uIU/mL   T4, FREE    Collection Time: 12/30/21  7:10 AM   Result Value Ref Range    T4, Free 1.1 0.78 - 1.46 NG/DL   HGB & HCT    Collection Time: 12/30/21  1:37 PM   Result Value Ref Range    HGB 8.3 (L) 11.7 - 15.4 g/dL    HCT 28.6 (L) 35.8 - 46.3 %   TROPONIN-HIGH SENSITIVITY    Collection Time: 12/30/21  1:37 PM   Result Value Ref Range    Troponin-High Sensitivity 40.9 (H) 0 - 14 pg/mL   HGB & HCT    Collection Time: 12/30/21 10:02 PM   Result Value Ref Range    HGB 7.5 (L) 11.7 - 15.4 g/dL    HCT 25.9 (L) 35.8 - 46.3 %   CBC W/O DIFF    Collection Time: 12/31/21  5:35 AM   Result Value Ref Range    WBC 8.1 4.3 - 11.1 K/uL    RBC 4.00 (L) 4.05 - 5.2 M/uL    HGB 7.8 (L) 11.7 - 15.4 g/dL    HCT 27.8 (L) 35.8 - 46.3 %    MCV 69.5 (L) 79.6 - 97.8 FL    MCH 19.5 (L) 26.1 - 32.9 PG    MCHC 28.1 (L) 31.4 - 35.0 g/dL    RDW 32.8 (H) 11.9 - 14.6 %    PLATELET 015 852 - 674 K/uL    MPV Unable to calculate. Recommend adding IPF.  9.4 - 12.3 FL    ABSOLUTE NRBC 0.00 0.0 - 0.2 K/uL   METABOLIC PANEL, BASIC    Collection Time: 12/31/21  5:35 AM   Result Value Ref Range    Sodium 137 136 - 145 mmol/L Potassium 4.0 3.5 - 5.1 mmol/L    Chloride 112 (H) 98 - 107 mmol/L    CO2 22 21 - 32 mmol/L    Anion gap 3 (L) 7 - 16 mmol/L    Glucose 107 (H) 65 - 100 mg/dL    BUN 9 6 - 23 MG/DL    Creatinine 0.57 (L) 0.6 - 1.0 MG/DL    GFR est AA >60 >60 ml/min/1.73m2    GFR est non-AA >60 >60 ml/min/1.73m2    Calcium 8.8 8.3 - 10.4 MG/DL   MAGNESIUM    Collection Time: 12/31/21  5:35 AM   Result Value Ref Range    Magnesium 2.4 1.8 - 2.4 mg/dL   HCG URINE, QL. - POC    Collection Time: 12/31/21 11:28 AM   Result Value Ref Range    Pregnancy test,urine (POC) Negative NEG         All Micro Results     Procedure Component Value Units Date/Time    COVID-19 RAPID TEST [[id number]] Collected: 12/29/21 1455    Order Status: Completed Specimen: Nasopharyngeal Updated: 12/29/21 1529     Specimen source Nasopharyngeal        COVID-19 rapid test Not detected        Comment:      The specimen is NEGATIVE for SARS-CoV-2, the novel coronavirus associated with COVID-19. A negative result does not rule out COVID-19. This test has been authorized by the FDA under an Emergency Use Authorization (EUA) for use by authorized laboratories. Fact sheet for Healthcare Providers: ConventionUpdate.co.nz  Fact sheet for Patients: ConventionUpdate.co.nz       Methodology: Isothermal Nucleic Acid Amplification               Other Studies:  US PELV NON OB W TV    Result Date: 12/30/2021  Ultrasound Pelvis Indication: 23-year-old female with subacute menometrorrhagia, anemia, LMP 12/13/2021 Comparison: Abdominopelvic CT 11/12/2016 Technique: Transabdominal and endovaginal grayscale and Doppler ultrasound was performed of the pelvis Findings: Initial transabdominal imaging: Uterus was well seen. The uterus measures 10.5 x 7.9 x 7.9 cm. There is a 6.2 x 5.1 x 5.6 cm oval heterogeneous hypoechoic intramural and submucosal mass that abuts the endometrial canal in the uterine body.  Just inferior to this there is a similar 2.6 x 2.5 x 2.5 cm mass. These demonstrate moderate blood flow on color Doppler evaluation. Urinary bladder is partially distended with anechoic fluid. The endometrial stripe is not well visualized or evaluated, likely distorted by the uterine mass is . No significant free fluid is seen in the pelvis. Endovaginal imaging was done for further evaluation of adnexal structures. The right ovary measures 2.2 x 1.9 x 1.8 cm, unremarkable appearance. The left ovary measures 4.0 x 2.6 x 3.0 cm, with a 2.1 cm cyst noted that is most compatible with physiologic corpus luteum. Doppler flow is documented within each ovary, with normal spectral Doppler waveforms. 1. Uterine masses, most compatible with fibroids. 2. Small left ovarian cyst is likely physiologic. 3. No suspicious abnormality elsewhere.        Current Meds:  Current Facility-Administered Medications   Medication Dose Route Frequency    lactated Ringers infusion  100 mL/hr IntraVENous CONTINUOUS    fentaNYL citrate (PF) injection 100 mcg  100 mcg IntraVENous ONCE    lactated Ringers infusion  100 mL/hr IntraVENous CONTINUOUS    midazolam (VERSED) injection 2 mg  2 mg IntraVENous ONCE PRN    midazolam (VERSED) injection 2 mg  2 mg IntraVENous ONCE    lidocaine (XYLOCAINE) 10 mg/mL (1 %) injection 0.1 mL  0.1 mL SubCUTAneous PRN    pantoprazole (PROTONIX) 40 mg in 0.9% sodium chloride 10 mL injection  40 mg IntraVENous DAILY    sodium chloride (NS) flush 5-10 mL  5-10 mL IntraVENous Q8H    sodium chloride (NS) flush 5-10 mL  5-10 mL IntraVENous PRN    sodium chloride (NS) flush 5-40 mL  5-40 mL IntraVENous PRN    acetaminophen (TYLENOL) tablet 650 mg  650 mg Oral Q6H PRN    Or    acetaminophen (TYLENOL) suppository 650 mg  650 mg Rectal Q6H PRN    polyethylene glycol (MIRALAX) packet 17 g  17 g Oral DAILY PRN    ondansetron (ZOFRAN ODT) tablet 4 mg  4 mg Oral Q8H PRN    Or    ondansetron (ZOFRAN) injection 4 mg  4 mg IntraVENous Q6H PRN    influenza vaccine 2021-22 (6 mos+)(PF) (FLUARIX/FLULAVAL/FLUZONE QUAD) injection 0.5 mL  1 Each IntraMUSCular PRIOR TO DISCHARGE       Signed:  Bahman Sung MD

## 2021-12-31 NOTE — CONSULTS
General Daily Progress Note    Admit Date: 12/29/2021  Hospital day 2    Subjective:     Patient has no complaint of pain or heavy bleeding. .       Current Facility-Administered Medications   Medication Dose Route Frequency    pantoprazole (PROTONIX) 40 mg in 0.9% sodium chloride 10 mL injection  40 mg IntraVENous DAILY    sodium chloride (NS) flush 5-10 mL  5-10 mL IntraVENous Q8H    sodium chloride (NS) flush 5-10 mL  5-10 mL IntraVENous PRN    sodium chloride (NS) flush 5-40 mL  5-40 mL IntraVENous PRN    acetaminophen (TYLENOL) tablet 650 mg  650 mg Oral Q6H PRN    Or    acetaminophen (TYLENOL) suppository 650 mg  650 mg Rectal Q6H PRN    polyethylene glycol (MIRALAX) packet 17 g  17 g Oral DAILY PRN    ondansetron (ZOFRAN ODT) tablet 4 mg  4 mg Oral Q8H PRN    Or    ondansetron (ZOFRAN) injection 4 mg  4 mg IntraVENous Q6H PRN    influenza vaccine 2021-22 (6 mos+)(PF) (FLUARIX/FLULAVAL/FLUZONE QUAD) injection 0.5 mL  1 Each IntraMUSCular PRIOR TO DISCHARGE        Review of Systems  Pt feeling much better today with no c/o    Objective:     Patient Vitals for the past 8 hrs:   BP Temp Pulse Resp SpO2   12/31/21 0800 109/62 98.1 °F (36.7 °C) 68 18 99 %     No intake/output data recorded. 12/29 1901 - 12/31 0700  In: 1269.7 [P.O.:240; I.V.:308]  Out: -     Physical Exam:   Visit Vitals  /62 (BP 1 Location: Left upper arm, BP Patient Position: At rest)   Pulse 68   Temp 98.1 °F (36.7 °C)   Resp 18   Ht 5' 2\" (1.575 m)   Wt 93 kg (205 lb)   SpO2 99%   BMI 37.49 kg/m²     General appearance: alert, cooperative, no distress  Lungs: clear to auscultation bilaterally  Heart: RRR with flow murmer  Abdomen: soft, non-tender.  Bowel sounds normal. No masses,  no organomegaly  Extremities: extremities normal, atraumatic, no cyanosis or edema          Data Review   Recent Results (from the past 24 hour(s))   HGB & HCT    Collection Time: 12/30/21  1:37 PM   Result Value Ref Range    HGB 8.3 (L) 11.7 - 15.4 g/dL    HCT 28.6 (L) 35.8 - 46.3 %   TROPONIN-HIGH SENSITIVITY    Collection Time: 12/30/21  1:37 PM   Result Value Ref Range    Troponin-High Sensitivity 40.9 (H) 0 - 14 pg/mL   HGB & HCT    Collection Time: 12/30/21 10:02 PM   Result Value Ref Range    HGB 7.5 (L) 11.7 - 15.4 g/dL    HCT 25.9 (L) 35.8 - 46.3 %   CBC W/O DIFF    Collection Time: 12/31/21  5:35 AM   Result Value Ref Range    WBC 8.1 4.3 - 11.1 K/uL    RBC 4.00 (L) 4.05 - 5.2 M/uL    HGB 7.8 (L) 11.7 - 15.4 g/dL    HCT 27.8 (L) 35.8 - 46.3 %    MCV 69.5 (L) 79.6 - 97.8 FL    MCH 19.5 (L) 26.1 - 32.9 PG    MCHC 28.1 (L) 31.4 - 35.0 g/dL    RDW 32.8 (H) 11.9 - 14.6 %    PLATELET 293 391 - 836 K/uL    MPV Unable to calculate. Recommend adding IPF. 9.4 - 12.3 FL    ABSOLUTE NRBC 0.00 0.0 - 0.2 K/uL   METABOLIC PANEL, BASIC    Collection Time: 12/31/21  5:35 AM   Result Value Ref Range    Sodium 137 136 - 145 mmol/L    Potassium 4.0 3.5 - 5.1 mmol/L    Chloride 112 (H) 98 - 107 mmol/L    CO2 22 21 - 32 mmol/L    Anion gap 3 (L) 7 - 16 mmol/L    Glucose 107 (H) 65 - 100 mg/dL    BUN 9 6 - 23 MG/DL    Creatinine 0.57 (L) 0.6 - 1.0 MG/DL    GFR est AA >60 >60 ml/min/1.73m2    GFR est non-AA >60 >60 ml/min/1.73m2    Calcium 8.8 8.3 - 10.4 MG/DL   MAGNESIUM    Collection Time: 12/31/21  5:35 AM   Result Value Ref Range    Magnesium 2.4 1.8 - 2.4 mg/dL     US PELV NON OB W TV    Result Date: 12/30/2021  1. Uterine masses, most compatible with fibroids. 2. Small left ovarian cyst is likely physiologic. 3. No suspicious abnormality elsewhere.          Assessment:     Principal Problem:    Acute blood loss anemia (12/29/2021)    Active Problems:    Menorrhagia with regular cycle (12/30/2021)      Gastrointestinal bleed (12/29/2021)      Class 2 obesity due to excess calories without serious comorbidity with body mass index (BMI) of 37.0 to 37.9 in adult (12/30/2021)      Abnormal uterine bleeding (AUB) (12/30/2021)        Plan:     26 yo G0 with severe anemia  Likely in part to submucosal fibroids with heavy menses  Much improved after 3 units PRBC  Recommend- to d/c from hospital with rx for OCP's such as sprintec or ortho novum  Pt to follow for gyn office visit for plan of care moving forward  Dr. John Garcia - next unassigned [address] for [facility] health [phone]  Pt to take Prenatal vit's with iron - can purchase OTC  I have discussed above plan with the patient and offered opportunity for her to ask questions    TSH - 3, recheck in a few months    GI results pending endoscope today    Thank you for this consult.  Please call for any questions  [phone]

## 2021-12-31 NOTE — INTERVAL H&P NOTE
Update History & Physical    The Patient's History and Physical of December 30, 2021 was reviewed with the patient and I examined the patient. There was no change. The surgical site was confirmed by the patient and me. Plan:  The risk, benefits, expected outcome, and alternative to the recommended procedure have been discussed with the patient. Patient understands and wants to proceed with the procedure.     Electronically signed by Antoine Phillips MD on 12/31/2021 at 11:40 AM

## 2021-12-31 NOTE — ANESTHESIA PREPROCEDURE EVALUATION
Relevant Problems   ENDOCRINE   (+) Class 2 obesity due to excess calories without serious comorbidity with body mass index (BMI) of 37.0 to 37.9 in adult      HEMATOLOGY   (+) Acute blood loss anemia       Anesthetic History   No history of anesthetic complications            Review of Systems / Medical History  Patient summary reviewed and pertinent labs reviewed    Pulmonary  Within defined limits                 Neuro/Psych   Within defined limits           Cardiovascular  Within defined limits                Exercise tolerance: >4 METS     GI/Hepatic/Renal  Within defined limits              Endo/Other        Obesity and anemia (Hb 7.8)     Other Findings              Physical Exam    Airway  Mallampati: II  TM Distance: 4 - 6 cm  Neck ROM: normal range of motion   Mouth opening: Normal     Cardiovascular  Regular rate and rhythm,  S1 and S2 normal,  no murmur, click, rub, or gallop             Dental         Pulmonary  Breath sounds clear to auscultation               Abdominal         Other Findings            Anesthetic Plan    ASA: 3  Anesthesia type: total IV anesthesia          Induction: Intravenous  Anesthetic plan and risks discussed with: Patient and Family

## 2021-12-31 NOTE — ASSESSMENT & PLAN NOTE
TV ultrasound with 2 intrauterine masses: 6.2 x 5.1 x 5.6 & 2.6 x 2.5 x 2.5 cm  12/31: Seen by Dr. Angela Jesus from GYN today, recommends OCP and OP FU

## 2021-12-31 NOTE — PROGRESS NOTES
Care Management Interventions  PCP Verified by CM: Yes  Palliative Care Criteria Met (RRAT>21 & CHF Dx)?: No  Mode of Transport at Discharge: Other (see comment) (family )  Transition of Care Consult (CM Consult): Discharge Planning  Discharge Durable Medical Equipment: No  Physical Therapy Consult: No  Occupational Therapy Consult: No  Speech Therapy Consult: No  Support Systems: Spouse/Significant Other Adiel Xiangjet)  Confirm Follow Up Transport: Family  Discharge Location  Discharge Placement: Home  Met with patient for d/c planning. Patient alert and oriented x 3, independent of ADL's and lives with her spouse Yogi Taylor was  Aug 21, 2021) Patient requires no DME and has transportation. She currently does not have insurance but will have insurance starting in January through her spouse work which she thinks is [employer]. CM discussed until have insurance about good Rx. Cele did see and they do not meet Medicaid guidelines income $[id number]. Patient hopes to d/c soon. She voices no concerns or needs. She does not have a PCP and agreeable to [facility] access hospital referral and this has been placed. D/C plan is home with spouse when medically stable.

## 2022-01-01 VITALS
RESPIRATION RATE: 18 BRPM | SYSTOLIC BLOOD PRESSURE: 115 MMHG | OXYGEN SATURATION: 99 % | WEIGHT: 205 LBS | TEMPERATURE: 98.3 F | DIASTOLIC BLOOD PRESSURE: 89 MMHG | HEIGHT: 62 IN | HEART RATE: 86 BPM | BODY MASS INDEX: 37.73 KG/M2

## 2022-01-01 PROBLEM — D25.0 INTRAMURAL AND SUBMUCOUS LEIOMYOMA OF UTERUS: Status: ACTIVE | Noted: 2022-01-01

## 2022-01-01 PROBLEM — D62 ACUTE BLOOD LOSS ANEMIA: Status: RESOLVED | Noted: 2021-12-29 | Resolved: 2022-01-01

## 2022-01-01 PROBLEM — D25.1 INTRAMURAL AND SUBMUCOUS LEIOMYOMA OF UTERUS: Status: ACTIVE | Noted: 2022-01-01

## 2022-01-01 PROBLEM — K92.2 GASTROINTESTINAL BLEED: Status: RESOLVED | Noted: 2021-12-29 | Resolved: 2022-01-01

## 2022-01-01 LAB
ANION GAP SERPL CALC-SCNC: 5 MMOL/L (ref 7–16)
BUN SERPL-MCNC: 12 MG/DL (ref 6–23)
CALCIUM SERPL-MCNC: 8.3 MG/DL (ref 8.3–10.4)
CHLORIDE SERPL-SCNC: 110 MMOL/L (ref 98–107)
CO2 SERPL-SCNC: 23 MMOL/L (ref 21–32)
CREAT SERPL-MCNC: 0.63 MG/DL (ref 0.6–1)
GLUCOSE SERPL-MCNC: 112 MG/DL (ref 65–100)
HCT VFR BLD AUTO: 27.9 % (ref 35.8–46.3)
HGB BLD-MCNC: 8 G/DL (ref 11.7–15.4)
MAGNESIUM SERPL-MCNC: 1.8 MG/DL (ref 1.8–2.4)
MCH RBC QN AUTO: 20.1 PG (ref 26.1–32.9)
MCHC RBC AUTO-ENTMCNC: 28.7 G/DL (ref 31.4–35)
MCV RBC AUTO: 70.1 FL (ref 79.6–97.8)
NRBC # BLD: 0 K/UL (ref 0–0.2)
PLATELET # BLD AUTO: 321 K/UL (ref 150–450)
PMV BLD AUTO: ABNORMAL FL (ref 9.4–12.3)
POTASSIUM SERPL-SCNC: 4 MMOL/L (ref 3.5–5.1)
RBC # BLD AUTO: 3.98 M/UL (ref 4.05–5.2)
SODIUM SERPL-SCNC: 138 MMOL/L (ref 136–145)
WBC # BLD AUTO: 8.9 K/UL (ref 4.3–11.1)

## 2022-01-01 PROCEDURE — 80048 BASIC METABOLIC PNL TOTAL CA: CPT

## 2022-01-01 PROCEDURE — 85027 COMPLETE CBC AUTOMATED: CPT

## 2022-01-01 PROCEDURE — 74011250637 HC RX REV CODE- 250/637: Performed by: INTERNAL MEDICINE

## 2022-01-01 PROCEDURE — 83735 ASSAY OF MAGNESIUM: CPT

## 2022-01-01 PROCEDURE — 36415 COLL VENOUS BLD VENIPUNCTURE: CPT

## 2022-01-01 RX ADMIN — PANTOPRAZOLE SODIUM 40 MG: 40 TABLET, DELAYED RELEASE ORAL at 08:27

## 2022-01-01 RX ADMIN — Medication 10 ML: at 06:16

## 2022-01-01 NOTE — PROGRESS NOTES
EOS    - prn PO tylenol given x1  - pt rested quietly throughout the night, vss, no needs voiced  - report given to oncoming nurse

## 2022-01-01 NOTE — PROGRESS NOTES
[address] January 1, 2022       RE: Cezar Gaffney      To Whom It May Concern,    Cezar Gaffney has been in the hospital from 12/29/21-1/1/22. This is to certify that Cezar Gaffney may return to work 1/3/22. Please feel free to contact my office if you have any questions or concerns. Thank you for your assistance in this matter.       Sincerely,  [facility] [employer] Staff

## 2022-01-01 NOTE — PROGRESS NOTES
Problem: Falls - Risk of  Goal: *Absence of Falls  Description: Document [place] Fall Risk and appropriate interventions in the flowsheet.   Outcome: Progressing Towards Goal  Note: Fall Risk Interventions:            Medication Interventions: Teach patient to arise slowly         History of Falls Interventions: Door open when patient unattended

## 2022-01-01 NOTE — DISCHARGE SUMMARY
Hospitalist Discharge Summary   Admit Date:  2021  2:42 PM   DC Note date: 2022  Name:  Irena Kingston   Age:  27 y.o. Sex:  female  :  1990   MRN:  [id number]   Room:  [facility]  PCP:  Unknown, Provider, DAVIDA    Presenting Complaint: Chest Pain    Initial Admission Diagnosis: Acute blood loss anemia [D62]  Gastrointestinal bleed [K92.2]     Problem List for this Hospitalization:  Hospital Problems as of 2022 Date Reviewed: 2022          Codes Class Noted - Resolved POA    * (Principal) RESOLVED: Acute blood loss anemia ICD-10-CM: D62  ICD-9-CM: 285.1  2021 - 2022 Yes        RESOLVED: Gastrointestinal bleed ICD-10-CM: K92.2  ICD-9-CM: 578.9  2021 - 2022 Yes        Menorrhagia with regular cycle (Chronic) ICD-10-CM: N92.0  ICD-9-CM: 626.2  2021 - Present Yes        Abnormal uterine bleeding (AUB) ICD-10-CM: N93.9  ICD-9-CM: 626.9  2021 - Present Yes        Class 2 obesity due to excess calories without serious comorbidity with body mass index (BMI) of 37.0 to 37.9 in adult (Chronic) ICD-10-CM: E66.09, R67.51  ICD-9-CM: 278.00, V85.37  2021 - Present Yes        Intramural and submucous leiomyoma of uterus ICD-10-CM: D25.1, D25.0  ICD-9-CM: 218.0, 218.1  2022 - Present Yes            Did Patient have Sepsis (YES OR NO): No    Hospital Course:  31F PMhx heavy menstrual cycles, BMI 37 who presented  with fatigue and chest pain. She had always had heavy cycles and has not taken anything for it. She has noted several days of progressive weakness. She began having chest pain that was crushing last night. She has noted bleeding from her rectum. She denies n/v/d, difficulty breathing, syncope, swelling, vision changes. Her Hgb was <4 and transfusion was initiated in the ED. Hospitalist admitted. Transfuse pRBC 2U , 1U . Gynecology and gastroenterology consulted. TV US  with 2 intrauterine masses.   EGD  did not show evidence of upper GI bleed. Hemoglobin remained stable and patient was determined to be safe for discharge on 1/1. She should follow-up with gynecology as noted below. She should follow-up with gastroenterology within 2 weeks. She should establish with a primary care provider. Primary care provider may consider the following:  -Medication changes as noted below  -Plan follow-up with specialists  -Lifestyle changes to prevent chronic disease  -Resolution of anemia    Disposition: Home or Self Care  Diet: ADULT DIET Regular  Code Status: Full Code    Follow Up Orders: Follow-up Appointments   Procedures    FOLLOW UP VISIT Appointment in: One Week Follow-up with gynecology within 1 week. Contact Dr. Lindsay Gaytan at [facility], [phone]     Follow-up with gynecology within 1 week. Contact Dr. Lindsay Gaytan at [facility], [phone]     Standing Status:   Standing     Number of Occurrences:   1     Order Specific Question:   Appointment in     Answer: One Week    FOLLOW UP VISIT Appointment in: Two Weeks Follow-up with gastroenterology within 2 weeks. Follow-up with gastroenterology within 2 weeks. Standing Status:   Standing     Number of Occurrences:   1     Standing Expiration Date:   1/2/2022     Order Specific Question:   Appointment in     Answer: Two Weeks    FOLLOW UP VISIT Appointment in: One Week Follow up with Primary Care Provider in 1 week. Follow up with Primary Care Provider in 1 week. Standing Status:   Standing     Number of Occurrences:   1     Standing Expiration Date:   1/2/2022     Order Specific Question:   Appointment in     Answer: One Week       Follow-up Information     Follow up With Specialties Details Why Contact Info    Unknown, Provider, DPM Podiatry   Patient not available to ask          Time spent in patient discharge and coordination 42 minutes. Plan was discussed with patient, spouse, nurse.   All questions answered. Patient was stable at time of discharge. Instructions given to call a physician or return if any concerns. Discharge Info:   Current Discharge Medication List      START taking these medications    Details   norethindrone-ethinyl estradiol (ORTHO-NOVUM 1-35 TAB, NORTREL 1-35 TAB) 1-35 mg-mcg tab Take 1 Tablet by mouth daily. Indications: abnormal bleeding from the uterus  Qty: 1 Dose Pack, Refills: 0  Start date: 1/1/2022         STOP taking these medications       hydrocortisone (Anusol-HC) 25 mg supp Comments:   Reason for Stopping:               Procedures done this admission:  Procedure(s):  ESOPHAGOGASTRODUODENOSCOPY (EGD) ROOM 367 ES    Consults this admission:  IP CONSULT TO GASTROENTEROLOGY  IP CONSULT TO OB GYN  IP CONSULT TO CARDIOLOGY    Echocardiogram/EKG results:  No results found for this or any previous visit.        EKG Results     Procedure 720 Value Units Date/Time    EKG, 12 LEAD, SUBSEQUENT [[id number]] Collected: 12/29/21 1230    Order Status: Completed Updated: 12/31/21 0750     Ventricular Rate 100 BPM      Atrial Rate 100 BPM      P-R Interval 122 ms      QRS Duration 68 ms      Q-T Interval 384 ms      QTC Calculation (Bezet) 495 ms      Calculated P Axis 57 degrees      Calculated R Axis 65 degrees      Calculated T Axis 54 degrees      Diagnosis --     Normal sinus rhythm  Prolonged QT  Abnormal ECG  No previous ECGs available  Confirmed by Tati Middleton ([id number]) on 12/31/2021 7:50:36 AM      EKG [[id number]] Collected: 12/29/21 1230    Order Status: Completed Updated: 12/29/21 1604     Ventricular Rate 100 BPM      Atrial Rate 100 BPM      P-R Interval 122 ms      QRS Duration 68 ms      Q-T Interval 384 ms      QTC Calculation (Bezet) 495 ms      Calculated P Axis 57 degrees      Calculated R Axis 65 degrees      Calculated T Axis 54 degrees      Diagnosis --     Normal sinus rhythm  Prolonged QT  Abnormal ECG  No previous ECGs available  Confirmed by ST CAMMIE LARSON MD (), DAHIANA LLAMAS ([id number]) on 12/29/2021 4:04:08 PM            Diagnostic Imaging/Tests:   XR CHEST PA LAT    Result Date: 12/29/2021  Chest 2 view CLINICAL INDICATION: Acute moderate shortness of breath and substernal chest tightness COMPARISON: Abdominal CT 11/12/2016 correlation TECHNIQUE: Upright PA  and lateral views of the chest FINDINGS: Lung volumes are well inflated. Cardiac silhouette is borderline-enlarged. Otherwise mediastinal and hilar appear unremarkable. The lungs are clear. No acute osseous abnormalities are seen. No acute disease. US PELV NON OB W TV    Result Date: 12/30/2021  Ultrasound Pelvis Indication: 25-year-old female with subacute menometrorrhagia, anemia, LMP 12/13/2021 Comparison: Abdominopelvic CT 11/12/2016 Technique: Transabdominal and endovaginal grayscale and Doppler ultrasound was performed of the pelvis Findings: Initial transabdominal imaging: Uterus was well seen. The uterus measures 10.5 x 7.9 x 7.9 cm. There is a 6.2 x 5.1 x 5.6 cm oval heterogeneous hypoechoic intramural and submucosal mass that abuts the endometrial canal in the uterine body. Just inferior to this there is a similar 2.6 x 2.5 x 2.5 cm mass. These demonstrate moderate blood flow on color Doppler evaluation. Urinary bladder is partially distended with anechoic fluid. The endometrial stripe is not well visualized or evaluated, likely distorted by the uterine mass is . No significant free fluid is seen in the pelvis. Endovaginal imaging was done for further evaluation of adnexal structures. The right ovary measures 2.2 x 1.9 x 1.8 cm, unremarkable appearance. The left ovary measures 4.0 x 2.6 x 3.0 cm, with a 2.1 cm cyst noted that is most compatible with physiologic corpus luteum. Doppler flow is documented within each ovary, with normal spectral Doppler waveforms. 1. Uterine masses, most compatible with fibroids. 2. Small left ovarian cyst is likely physiologic. 3. No suspicious abnormality elsewhere. All Micro Results     Procedure Component Value Units Date/Time    COVID-19 RAPID TEST [[id number]] Collected: 12/29/21 1453    Order Status: Completed Specimen: Nasopharyngeal Updated: 12/29/21 1529     Specimen source Nasopharyngeal        COVID-19 rapid test Not detected        Comment:      The specimen is NEGATIVE for SARS-CoV-2, the novel coronavirus associated with COVID-19. A negative result does not rule out COVID-19. This test has been authorized by the FDA under an Emergency Use Authorization (EUA) for use by authorized laboratories. Fact sheet for Healthcare Providers: [employer]nz  Fact sheet for Patients: [employer]       Methodology: Isothermal Nucleic Acid Amplification               Labs: Results:       BMP, Mg, Phos Recent Labs     01/01/22  0536 12/31/21  0535 12/30/21  0710    137 136   K 4.0 4.0 3.7   * 112* 110*   CO2 23 22 22   AGAP 5* 3* 4*   BUN 12 9 6   CREA 0.63 0.57* 0.52*   CA 8.3 8.8 8.9   * 107* 91   MG 1.8 2.4 2.4      CBC Recent Labs     01/01/22  0536 12/31/21  0535 12/30/21  2202 12/30/21  1337 12/30/21  0710 12/29/21  2201 12/29/21  1236   WBC 8.9 8.1  --   --  7.0  --  6.2   RBC 3.98* 4.00*  --   --  4.01*  --  2.92*   HGB 8.0* 7.8* 7.5*   < > 8.0*   < > 4.0*   HCT 27.9* 27.8* 25.9*   < > 27.3*   < > 16.8*    339  --   --  323  --  407   GRANS  --   --   --   --   --   --  54   LYMPH  --   --   --   --   --   --  36   EOS  --   --   --   --   --   --  3   MONOS  --   --   --   --   --   --  6   BASOS  --   --   --   --   --   --  1   IG  --   --   --   --   --   --  0   ANEU  --   --   --   --   --   --  3.3   ABL  --   --   --   --   --   --  2.2   RAKAN  --   --   --   --   --   --  0.2   ABM  --   --   --   --   --   --  0.3   ABB  --   --   --   --   --   --  0.0   AIG  --   --   --   --   --   --  0.0    < > = values in this interval not displayed.       LFT Recent Labs 12/29/21  1236   ALT 12   AP 71   TP 8.0   ALB 3.3*   GLOB 4.7*   AGRAT 0.7*      Cardiac Testing No results found for: BNPP, BNP, CPK, RCK1, RCK2, RCK3, RCK4, CKMB, CKNDX, CKND1, TROPT, TROIQ   Coagulation Tests Lab Results   Component Value Date/Time    Prothrombin time 14.4 12/30/2021 07:10 AM    INR 1.1 12/30/2021 07:10 AM      A1c No results found for: HBA1C, TFQ4IIRO   Lipid Panel No results found for: CHOL, CHOLPOCT, CHOLX, CHLST, CHOLV, 346587, HDL, HDLP, LDL, LDLC, DLDLP, 639194, VLDLC, VLDL, TGLX, TRIGL, TRIGP, TGLPOCT, CHHD, [facility]   Thyroid Panel Lab Results   Component Value Date/Time    TSH 3.020 12/30/2021 07:10 AM    T4, Free 1.1 12/30/2021 07:10 AM        Most Recent UA Lab Results   Component Value Date/Time    WBC 0-3 11/12/2016 08:10 AM    RBC 5-10 11/12/2016 08:10 AM    Epithelial cells 0-3 11/12/2016 08:10 AM    Bacteria 0 11/12/2016 08:10 AM    Casts 0 11/12/2016 08:10 AM          All Labs from Last 24 Hrs:  Recent Results (from the past 24 hour(s))   HCG URINE, QL. - POC    Collection Time: 12/31/21 11:28 AM   Result Value Ref Range    Pregnancy test,urine (POC) Negative NEG     CBC W/O DIFF    Collection Time: 01/01/22  5:36 AM   Result Value Ref Range    WBC 8.9 4.3 - 11.1 K/uL    RBC 3.98 (L) 4.05 - 5.2 M/uL    HGB 8.0 (L) 11.7 - 15.4 g/dL    HCT 27.9 (L) 35.8 - 46.3 %    MCV 70.1 (L) 79.6 - 97.8 FL    MCH 20.1 (L) 26.1 - 32.9 PG    MCHC 28.7 (L) 31.4 - 35.0 g/dL    PLATELET 900 011 - 462 K/uL    MPV Unable to calculate. Recommend adding IPF.  9.4 - 12.3 FL    ABSOLUTE NRBC 0.00 0.0 - 0.2 K/uL   METABOLIC PANEL, BASIC    Collection Time: 01/01/22  5:36 AM   Result Value Ref Range    Sodium 138 136 - 145 mmol/L    Potassium 4.0 3.5 - 5.1 mmol/L    Chloride 110 (H) 98 - 107 mmol/L    CO2 23 21 - 32 mmol/L    Anion gap 5 (L) 7 - 16 mmol/L    Glucose 112 (H) 65 - 100 mg/dL    BUN 12 6 - 23 MG/DL    Creatinine 0.63 0.6 - 1.0 MG/DL    GFR est AA >60 >60 ml/min/1.73m2    GFR est non-AA >60 >60 ml/min/1.73m2    Calcium 8.3 8.3 - 10.4 MG/DL   MAGNESIUM    Collection Time: 01/01/22  5:36 AM   Result Value Ref Range    Magnesium 1.8 1.8 - 2.4 mg/dL       Current Med List in Hospital:   Current Facility-Administered Medications   Medication Dose Route Frequency    pantoprazole (PROTONIX) tablet 40 mg  40 mg Oral ACB    sodium chloride (NS) flush 5-10 mL  5-10 mL IntraVENous Q8H    sodium chloride (NS) flush 5-10 mL  5-10 mL IntraVENous PRN    sodium chloride (NS) flush 5-40 mL  5-40 mL IntraVENous PRN    acetaminophen (TYLENOL) tablet 650 mg  650 mg Oral Q6H PRN    Or    acetaminophen (TYLENOL) suppository 650 mg  650 mg Rectal Q6H PRN    polyethylene glycol (MIRALAX) packet 17 g  17 g Oral DAILY PRN    ondansetron (ZOFRAN ODT) tablet 4 mg  4 mg Oral Q8H PRN    Or    ondansetron (ZOFRAN) injection 4 mg  4 mg IntraVENous Q6H PRN    influenza vaccine 2021-22 (6 mos+)(PF) (FLUARIX/FLULAVAL/FLUZONE QUAD) injection 0.5 mL  1 Each IntraMUSCular PRIOR TO DISCHARGE       No Known Allergies  There is no immunization history for the selected administration types on file for this patient.     Recent Vital Data:  Patient Vitals for the past 24 hrs:   Temp Pulse Resp BP SpO2   01/01/22 0801 98.3 °F (36.8 °C) 86 18 115/89 99 %   01/01/22 0316 99 °F (37.2 °C) 89 19 129/71 98 %   12/31/21 2247 99 °F (37.2 °C) 92 18 (!) 144/78 100 %   12/31/21 2103     100 %   12/31/21 1844 98.1 °F (36.7 °C) 80 18 115/68 100 %   12/31/21 1552 98.2 °F (36.8 °C) 76 18 117/66 98 %   12/31/21 1320  70 20 120/64 96 %   12/31/21 1318  67 17  100 %   12/31/21 1315  67 14  100 %   12/31/21 1310  66 22  100 %   12/31/21 1305  70 19 114/63 100 %   12/31/21 1300  66 21 (!) 111/58 99 %   12/31/21 1255  66 20 (!) 109/56 100 %   12/31/21 1250  72 27 (!) 112/57 97 %   12/31/21 1249 98.6 °F (37 °C) 73 16 (!) 114/56 97 %   12/31/21 1215  88 18 125/78 100 %     Oxygen Therapy  O2 Sat (%): 99 % (01/01/22 0801)  Pulse via Oximetry: 72 beats per minute (12/31/21 2103)  O2 Device: None (Room air) (12/31/21 2103)  O2 Flow Rate (L/min): 1 l/min (12/31/21 1249)    Estimated body mass index is 37.49 kg/m² as calculated from the following:    Height as of this encounter: 5' 2\" (1.575 m). Weight as of this encounter: 93 kg (205 lb). Intake/Output Summary (Last 24 hours) at 1/1/2022 1017  Last data filed at 1/1/2022 0617  Gross per 24 hour   Intake 1335 ml   Output 850 ml   Net 485 ml         Physical Exam  Vitals and nursing note reviewed. Constitutional:       General: She is not in acute distress. Appearance: Normal appearance. She is obese. Comments: affable   HENT:      Head: Normocephalic. Eyes:      Extraocular Movements: Extraocular movements intact. Cardiovascular:      Rate and Rhythm: Normal rate and regular rhythm. Pulmonary:      Effort: Pulmonary effort is normal. No respiratory distress. Musculoskeletal:         General: No deformity. Cervical back: No rigidity. Skin:     General: Skin is warm and dry. Coloration: Skin is not pale. Neurological:      General: No focal deficit present. Mental Status: She is alert and oriented to person, place, and time.    Psychiatric:         Mood and Affect: Mood normal.         Behavior: Behavior normal.       Signed:  Shaquille Shoemaker MD

## 2022-01-01 NOTE — PROGRESS NOTES
Discharge education completed with patient at bedside. All questions answered. IV removed. Pt escorted down to vehicle.

## 2022-01-02 LAB
ABO + RH BLD: NORMAL
BLD PROD TYP BPU: NORMAL
BLOOD GROUP ANTIBODIES SERPL: NORMAL
BPU ID: NORMAL
CROSSMATCH RESULT,%XM: NORMAL
SPECIMEN EXP DATE BLD: NORMAL
STATUS OF UNIT,%ST: NORMAL
UNIT DIVISION, %UDIV: 0

## 2022-03-18 PROBLEM — N93.9 ABNORMAL UTERINE BLEEDING (AUB): Status: ACTIVE | Noted: 2021-12-30

## 2022-03-19 PROBLEM — D25.1 INTRAMURAL AND SUBMUCOUS LEIOMYOMA OF UTERUS: Status: ACTIVE | Noted: 2022-01-01

## 2022-03-19 PROBLEM — E66.812 CLASS 2 OBESITY DUE TO EXCESS CALORIES WITHOUT SERIOUS COMORBIDITY WITH BODY MASS INDEX (BMI) OF 37.0 TO 37.9 IN ADULT: Status: ACTIVE | Noted: 2021-12-30

## 2022-03-19 PROBLEM — N92.0 MENORRHAGIA WITH REGULAR CYCLE: Status: ACTIVE | Noted: 2021-12-30

## 2022-03-19 PROBLEM — E66.09 CLASS 2 OBESITY DUE TO EXCESS CALORIES WITHOUT SERIOUS COMORBIDITY WITH BODY MASS INDEX (BMI) OF 37.0 TO 37.9 IN ADULT: Status: ACTIVE | Noted: 2021-12-30

## 2022-03-19 PROBLEM — D25.0 INTRAMURAL AND SUBMUCOUS LEIOMYOMA OF UTERUS: Status: ACTIVE | Noted: 2022-01-01

## 2025-03-14 ENCOUNTER — APPOINTMENT (OUTPATIENT)
Dept: GENERAL RADIOLOGY | Age: 35
End: 2025-03-14
Payer: COMMERCIAL

## 2025-03-14 ENCOUNTER — HOSPITAL ENCOUNTER (OUTPATIENT)
Age: 35
Setting detail: OBSERVATION
Discharge: HOME | End: 2025-03-15
Attending: EMERGENCY MEDICINE | Admitting: OBSTETRICS & GYNECOLOGY
Payer: COMMERCIAL

## 2025-03-14 DIAGNOSIS — N92.0 MENORRHAGIA WITH REGULAR CYCLE: ICD-10-CM

## 2025-03-14 DIAGNOSIS — D64.9 ACUTE ON CHRONIC ANEMIA: Primary | ICD-10-CM

## 2025-03-14 PROBLEM — D25.9 ABNORMAL UTERINE BLEEDING DUE TO LEIOMYOMA OF UTERUS: Status: ACTIVE | Noted: 2025-03-14

## 2025-03-14 PROBLEM — N93.9 ABNORMAL UTERINE BLEEDING DUE TO LEIOMYOMA OF UTERUS: Status: ACTIVE | Noted: 2025-03-14

## 2025-03-14 LAB
ALBUMIN SERPL-MCNC: 3.8 G/DL (ref 3.5–5)
ALBUMIN/GLOB SERPL: 0.9 (ref 1–1.9)
ALP SERPL-CCNC: 65 U/L (ref 35–104)
ALT SERPL-CCNC: 8 U/L (ref 8–45)
ANION GAP SERPL CALC-SCNC: 14 MMOL/L (ref 7–16)
AST SERPL-CCNC: 13 U/L (ref 15–37)
BASOPHILS # BLD: 0.04 K/UL (ref 0–0.2)
BASOPHILS NFR BLD: 0.4 % (ref 0–2)
BILIRUB SERPL-MCNC: 0.2 MG/DL (ref 0–1.2)
BILIRUB UR QL: NEGATIVE
BUN SERPL-MCNC: 11 MG/DL (ref 6–23)
CALCIUM SERPL-MCNC: 8.7 MG/DL (ref 8.8–10.2)
CHLORIDE SERPL-SCNC: 101 MMOL/L (ref 98–107)
CO2 SERPL-SCNC: 20 MMOL/L (ref 20–29)
CREAT SERPL-MCNC: 0.55 MG/DL (ref 0.6–1.1)
DIFFERENTIAL METHOD BLD: ABNORMAL
EOSINOPHIL # BLD: 0.12 K/UL (ref 0–0.8)
EOSINOPHIL NFR BLD: 1.3 % (ref 0.5–7.8)
ERYTHROCYTE [DISTWIDTH] IN BLOOD BY AUTOMATED COUNT: 22.3 % (ref 11.9–14.6)
GLOBULIN SER CALC-MCNC: 4.3 G/DL (ref 2.3–3.5)
GLUCOSE SERPL-MCNC: 120 MG/DL (ref 70–99)
GLUCOSE UR QL STRIP.AUTO: NEGATIVE MG/DL
HCG UR QL: NEGATIVE
HCT VFR BLD AUTO: 17 % (ref 35.8–46.3)
HGB BLD-MCNC: 4.1 G/DL (ref 11.7–15.4)
HISTORY CHECK: NORMAL
IMM GRANULOCYTES # BLD AUTO: 0.03 K/UL (ref 0–0.5)
IMM GRANULOCYTES NFR BLD AUTO: 0.3 % (ref 0–5)
INR PPP: 1.2
KETONES UR-MCNC: NEGATIVE MG/DL
LEUKOCYTE ESTERASE UR QL STRIP: NEGATIVE
LIPASE SERPL-CCNC: 17 U/L (ref 13–60)
LYMPHOCYTES # BLD: 1.56 K/UL (ref 0.5–4.6)
LYMPHOCYTES NFR BLD: 17.4 % (ref 13–44)
MCH RBC QN AUTO: 13.7 PG (ref 26.1–32.9)
MCHC RBC AUTO-ENTMCNC: 24.1 G/DL (ref 31.4–35)
MCV RBC AUTO: 56.7 FL (ref 82–102)
MONOCYTES # BLD: 0.49 K/UL (ref 0.1–1.3)
MONOCYTES NFR BLD: 5.5 % (ref 4–12)
NEUTS SEG # BLD: 6.72 K/UL (ref 1.7–8.2)
NEUTS SEG NFR BLD: 75.1 % (ref 43–78)
NITRITE UR QL: NEGATIVE
NRBC # BLD: 0 K/UL (ref 0–0.2)
PH UR: 6 (ref 5–9)
PLATELET # BLD AUTO: 402 K/UL (ref 150–450)
PMV BLD AUTO: 10.4 FL (ref 9.4–12.3)
POTASSIUM SERPL-SCNC: 3.8 MMOL/L (ref 3.5–5.1)
PROT SERPL-MCNC: 8.1 G/DL (ref 6.3–8.2)
PROT UR QL: NEGATIVE MG/DL
PROTHROMBIN TIME: 15.2 SEC (ref 11.3–14.9)
RBC # BLD AUTO: 3 M/UL (ref 4.05–5.2)
RBC # UR STRIP: ABNORMAL
SERVICE CMNT-IMP: ABNORMAL
SODIUM SERPL-SCNC: 135 MMOL/L (ref 136–145)
SP GR UR: >1.03 (ref 1–1.02)
UROBILINOGEN UR QL: 0.2 EU/DL (ref 0.2–1)
WBC # BLD AUTO: 9 K/UL (ref 4.3–11.1)

## 2025-03-14 PROCEDURE — P9016 RBC LEUKOCYTES REDUCED: HCPCS

## 2025-03-14 PROCEDURE — G0378 HOSPITAL OBSERVATION PER HR: HCPCS

## 2025-03-14 PROCEDURE — 2700000000 HC OXYGEN THERAPY PER DAY

## 2025-03-14 PROCEDURE — 86920 COMPATIBILITY TEST SPIN: CPT

## 2025-03-14 PROCEDURE — 86850 RBC ANTIBODY SCREEN: CPT

## 2025-03-14 PROCEDURE — 81003 URINALYSIS AUTO W/O SCOPE: CPT

## 2025-03-14 PROCEDURE — 83690 ASSAY OF LIPASE: CPT

## 2025-03-14 PROCEDURE — 71045 X-RAY EXAM CHEST 1 VIEW: CPT

## 2025-03-14 PROCEDURE — 2500000003 HC RX 250 WO HCPCS: Performed by: OBSTETRICS & GYNECOLOGY

## 2025-03-14 PROCEDURE — 36430 TRANSFUSION BLD/BLD COMPNT: CPT

## 2025-03-14 PROCEDURE — 99285 EMERGENCY DEPT VISIT HI MDM: CPT

## 2025-03-14 PROCEDURE — 93005 ELECTROCARDIOGRAM TRACING: CPT | Performed by: EMERGENCY MEDICINE

## 2025-03-14 PROCEDURE — 86923 COMPATIBILITY TEST ELECTRIC: CPT

## 2025-03-14 PROCEDURE — 96375 TX/PRO/DX INJ NEW DRUG ADDON: CPT

## 2025-03-14 PROCEDURE — 85025 COMPLETE CBC W/AUTO DIFF WBC: CPT

## 2025-03-14 PROCEDURE — 6360000002 HC RX W HCPCS: Performed by: OBSTETRICS & GYNECOLOGY

## 2025-03-14 PROCEDURE — 86900 BLOOD TYPING SEROLOGIC ABO: CPT

## 2025-03-14 PROCEDURE — 85610 PROTHROMBIN TIME: CPT

## 2025-03-14 PROCEDURE — 81025 URINE PREGNANCY TEST: CPT

## 2025-03-14 PROCEDURE — 2500000003 HC RX 250 WO HCPCS: Performed by: EMERGENCY MEDICINE

## 2025-03-14 PROCEDURE — 80053 COMPREHEN METABOLIC PANEL: CPT

## 2025-03-14 PROCEDURE — 96365 THER/PROPH/DIAG IV INF INIT: CPT

## 2025-03-14 PROCEDURE — 86901 BLOOD TYPING SEROLOGIC RH(D): CPT

## 2025-03-14 PROCEDURE — 6370000000 HC RX 637 (ALT 250 FOR IP): Performed by: OBSTETRICS & GYNECOLOGY

## 2025-03-14 RX ORDER — ACETAMINOPHEN 650 MG/1
650 SUPPOSITORY RECTAL EVERY 6 HOURS PRN
Status: DISCONTINUED | OUTPATIENT
Start: 2025-03-14 | End: 2025-03-15 | Stop reason: HOSPADM

## 2025-03-14 RX ORDER — SODIUM CHLORIDE 0.9 % (FLUSH) 0.9 %
5-40 SYRINGE (ML) INJECTION EVERY 12 HOURS SCHEDULED
Status: DISCONTINUED | OUTPATIENT
Start: 2025-03-14 | End: 2025-03-15 | Stop reason: HOSPADM

## 2025-03-14 RX ORDER — DIPHENHYDRAMINE HYDROCHLORIDE 50 MG/ML
25 INJECTION, SOLUTION INTRAMUSCULAR; INTRAVENOUS ONCE
Status: COMPLETED | OUTPATIENT
Start: 2025-03-14 | End: 2025-03-14

## 2025-03-14 RX ORDER — POLYETHYLENE GLYCOL 3350 17 G/17G
17 POWDER, FOR SOLUTION ORAL DAILY PRN
Status: DISCONTINUED | OUTPATIENT
Start: 2025-03-14 | End: 2025-03-15 | Stop reason: HOSPADM

## 2025-03-14 RX ORDER — SODIUM CHLORIDE 0.9 % (FLUSH) 0.9 %
5-40 SYRINGE (ML) INJECTION PRN
Status: DISCONTINUED | OUTPATIENT
Start: 2025-03-14 | End: 2025-03-15 | Stop reason: HOSPADM

## 2025-03-14 RX ORDER — ACETAMINOPHEN 325 MG/1
650 TABLET ORAL EVERY 6 HOURS PRN
Status: DISCONTINUED | OUTPATIENT
Start: 2025-03-14 | End: 2025-03-15 | Stop reason: HOSPADM

## 2025-03-14 RX ORDER — ACETAMINOPHEN 325 MG/1
650 TABLET ORAL ONCE
Status: DISCONTINUED | OUTPATIENT
Start: 2025-03-14 | End: 2025-03-15 | Stop reason: HOSPADM

## 2025-03-14 RX ORDER — SODIUM CHLORIDE 9 MG/ML
INJECTION, SOLUTION INTRAVENOUS PRN
Status: DISCONTINUED | OUTPATIENT
Start: 2025-03-14 | End: 2025-03-15

## 2025-03-14 RX ORDER — ONDANSETRON 2 MG/ML
4 INJECTION INTRAMUSCULAR; INTRAVENOUS EVERY 6 HOURS PRN
Status: DISCONTINUED | OUTPATIENT
Start: 2025-03-14 | End: 2025-03-15 | Stop reason: HOSPADM

## 2025-03-14 RX ORDER — MAGNESIUM HYDROXIDE/ALUMINUM HYDROXICE/SIMETHICONE 120; 1200; 1200 MG/30ML; MG/30ML; MG/30ML
30 SUSPENSION ORAL EVERY 6 HOURS PRN
Status: DISCONTINUED | OUTPATIENT
Start: 2025-03-14 | End: 2025-03-15 | Stop reason: HOSPADM

## 2025-03-14 RX ORDER — ONDANSETRON 4 MG/1
4 TABLET, ORALLY DISINTEGRATING ORAL EVERY 8 HOURS PRN
Status: DISCONTINUED | OUTPATIENT
Start: 2025-03-14 | End: 2025-03-15 | Stop reason: HOSPADM

## 2025-03-14 RX ORDER — TRANEXAMIC ACID 10 MG/ML
1000 INJECTION, SOLUTION INTRAVENOUS
Status: COMPLETED | OUTPATIENT
Start: 2025-03-14 | End: 2025-03-14

## 2025-03-14 RX ADMIN — TRANEXAMIC ACID 1000 MG: 10 INJECTION, SOLUTION INTRAVENOUS at 21:55

## 2025-03-14 RX ADMIN — DIPHENHYDRAMINE HYDROCHLORIDE 25 MG: 50 INJECTION INTRAMUSCULAR; INTRAVENOUS at 22:55

## 2025-03-14 RX ADMIN — ACETAMINOPHEN 650 MG: 325 TABLET, FILM COATED ORAL at 22:55

## 2025-03-14 RX ADMIN — SODIUM CHLORIDE, PRESERVATIVE FREE 10 ML: 5 INJECTION INTRAVENOUS at 22:58

## 2025-03-14 ASSESSMENT — ENCOUNTER SYMPTOMS
SHORTNESS OF BREATH: 1
CHEST TIGHTNESS: 1

## 2025-03-14 ASSESSMENT — PAIN SCALES - GENERAL: PAINLEVEL_OUTOF10: 10

## 2025-03-14 ASSESSMENT — PAIN - FUNCTIONAL ASSESSMENT: PAIN_FUNCTIONAL_ASSESSMENT: 0-10

## 2025-03-15 ENCOUNTER — APPOINTMENT (OUTPATIENT)
Dept: ULTRASOUND IMAGING | Age: 35
End: 2025-03-15
Payer: COMMERCIAL

## 2025-03-15 VITALS
OXYGEN SATURATION: 99 % | SYSTOLIC BLOOD PRESSURE: 128 MMHG | BODY MASS INDEX: 38.09 KG/M2 | DIASTOLIC BLOOD PRESSURE: 82 MMHG | TEMPERATURE: 98.1 F | RESPIRATION RATE: 16 BRPM | WEIGHT: 215 LBS | HEIGHT: 63 IN | HEART RATE: 83 BPM

## 2025-03-15 LAB
ANION GAP SERPL CALC-SCNC: 12 MMOL/L (ref 7–16)
BASOPHILS # BLD: 0.05 K/UL (ref 0–0.2)
BASOPHILS NFR BLD: 0.8 % (ref 0–2)
BUN SERPL-MCNC: 8 MG/DL (ref 6–23)
CALCIUM SERPL-MCNC: 8.7 MG/DL (ref 8.8–10.2)
CHLORIDE SERPL-SCNC: 105 MMOL/L (ref 98–107)
CO2 SERPL-SCNC: 20 MMOL/L (ref 20–29)
CREAT SERPL-MCNC: 0.59 MG/DL (ref 0.6–1.1)
DIFFERENTIAL METHOD BLD: ABNORMAL
EKG ATRIAL RATE: 95 BPM
EKG DIAGNOSIS: NORMAL
EKG P AXIS: 63 DEGREES
EKG P-R INTERVAL: 133 MS
EKG Q-T INTERVAL: 380 MS
EKG QRS DURATION: 74 MS
EKG QTC CALCULATION (BAZETT): 478 MS
EKG R AXIS: 73 DEGREES
EKG T AXIS: 56 DEGREES
EKG VENTRICULAR RATE: 95 BPM
EOSINOPHIL # BLD: 0.15 K/UL (ref 0–0.8)
EOSINOPHIL NFR BLD: 2.4 % (ref 0.5–7.8)
ERYTHROCYTE [DISTWIDTH] IN BLOOD BY AUTOMATED COUNT: 29.9 % (ref 11.9–14.6)
FERRITIN SERPL-MCNC: 4 NG/ML (ref 8–388)
GLUCOSE SERPL-MCNC: 114 MG/DL (ref 70–99)
HCT VFR BLD AUTO: 24.6 % (ref 35.8–46.3)
HCT VFR BLD AUTO: 29.8 % (ref 35.8–46.3)
HGB BLD-MCNC: 6.9 G/DL (ref 11.7–15.4)
HGB BLD-MCNC: 8.6 G/DL (ref 11.7–15.4)
HISTORY CHECK: NORMAL
IMM GRANULOCYTES # BLD AUTO: 0.02 K/UL (ref 0–0.5)
IMM GRANULOCYTES NFR BLD AUTO: 0.3 % (ref 0–5)
IRON SATN MFR SERPL: 52 % (ref 20–50)
IRON SERPL-MCNC: 213 UG/DL (ref 35–100)
LYMPHOCYTES # BLD: 1.94 K/UL (ref 0.5–4.6)
LYMPHOCYTES NFR BLD: 31.4 % (ref 13–44)
MCH RBC QN AUTO: 18.5 PG (ref 26.1–32.9)
MCHC RBC AUTO-ENTMCNC: 28 G/DL (ref 31.4–35)
MCV RBC AUTO: 66.1 FL (ref 82–102)
MONOCYTES # BLD: 0.45 K/UL (ref 0.1–1.3)
MONOCYTES NFR BLD: 7.3 % (ref 4–12)
NEUTS SEG # BLD: 3.56 K/UL (ref 1.7–8.2)
NEUTS SEG NFR BLD: 57.8 % (ref 43–78)
NRBC # BLD: 0 K/UL (ref 0–0.2)
PLATELET # BLD AUTO: 338 K/UL (ref 150–450)
PMV BLD AUTO: ABNORMAL FL (ref 9.4–12.3)
POTASSIUM SERPL-SCNC: 3.9 MMOL/L (ref 3.5–5.1)
RBC # BLD AUTO: 3.72 M/UL (ref 4.05–5.2)
SODIUM SERPL-SCNC: 137 MMOL/L (ref 136–145)
TIBC SERPL-MCNC: 411 UG/DL (ref 240–450)
TSH W FREE THYROID IF ABNORMAL: 2.41 UIU/ML (ref 0.27–4.2)
UIBC SERPL-MCNC: 198 UG/DL (ref 112–347)
WBC # BLD AUTO: 6.2 K/UL (ref 4.3–11.1)

## 2025-03-15 PROCEDURE — 85025 COMPLETE CBC W/AUTO DIFF WBC: CPT

## 2025-03-15 PROCEDURE — 83540 ASSAY OF IRON: CPT

## 2025-03-15 PROCEDURE — 85018 HEMOGLOBIN: CPT

## 2025-03-15 PROCEDURE — 82728 ASSAY OF FERRITIN: CPT

## 2025-03-15 PROCEDURE — 36430 TRANSFUSION BLD/BLD COMPNT: CPT

## 2025-03-15 PROCEDURE — 83550 IRON BINDING TEST: CPT

## 2025-03-15 PROCEDURE — 2580000003 HC RX 258: Performed by: INTERNAL MEDICINE

## 2025-03-15 PROCEDURE — 94760 N-INVAS EAR/PLS OXIMETRY 1: CPT

## 2025-03-15 PROCEDURE — 93010 ELECTROCARDIOGRAM REPORT: CPT | Performed by: INTERNAL MEDICINE

## 2025-03-15 PROCEDURE — 96375 TX/PRO/DX INJ NEW DRUG ADDON: CPT

## 2025-03-15 PROCEDURE — 84443 ASSAY THYROID STIM HORMONE: CPT

## 2025-03-15 PROCEDURE — 6370000000 HC RX 637 (ALT 250 FOR IP): Performed by: OBSTETRICS & GYNECOLOGY

## 2025-03-15 PROCEDURE — 80048 BASIC METABOLIC PNL TOTAL CA: CPT

## 2025-03-15 PROCEDURE — G0378 HOSPITAL OBSERVATION PER HR: HCPCS

## 2025-03-15 PROCEDURE — 6360000002 HC RX W HCPCS: Performed by: INTERNAL MEDICINE

## 2025-03-15 PROCEDURE — 76856 US EXAM PELVIC COMPLETE: CPT

## 2025-03-15 PROCEDURE — 85014 HEMATOCRIT: CPT

## 2025-03-15 PROCEDURE — 2700000000 HC OXYGEN THERAPY PER DAY

## 2025-03-15 PROCEDURE — 94761 N-INVAS EAR/PLS OXIMETRY MLT: CPT

## 2025-03-15 PROCEDURE — P9016 RBC LEUKOCYTES REDUCED: HCPCS

## 2025-03-15 RX ORDER — IBUPROFEN 800 MG/1
800 TABLET, FILM COATED ORAL EVERY 8 HOURS PRN
Qty: 60 TABLET | Refills: 1 | Status: SHIPPED | OUTPATIENT
Start: 2025-03-15

## 2025-03-15 RX ORDER — NORGESTIMATE AND ETHINYL ESTRADIOL 0.25-0.035
KIT ORAL
Qty: 4 PACKET | Refills: 3 | Status: SHIPPED | OUTPATIENT
Start: 2025-03-15

## 2025-03-15 RX ORDER — SODIUM CHLORIDE 9 MG/ML
INJECTION, SOLUTION INTRAVENOUS PRN
Status: DISCONTINUED | OUTPATIENT
Start: 2025-03-15 | End: 2025-03-15

## 2025-03-15 RX ORDER — ONDANSETRON 4 MG/1
4 TABLET, ORALLY DISINTEGRATING ORAL EVERY 8 HOURS PRN
Qty: 20 TABLET | Refills: 1 | Status: SHIPPED | OUTPATIENT
Start: 2025-03-15

## 2025-03-15 RX ORDER — POLYETHYLENE GLYCOL 3350 17 G/17G
17 POWDER, FOR SOLUTION ORAL DAILY PRN
COMMUNITY
Start: 2025-03-15 | End: 2025-04-14

## 2025-03-15 RX ORDER — FERROUS SULFATE 325(65) MG
325 TABLET ORAL
COMMUNITY
Start: 2025-03-16

## 2025-03-15 RX ORDER — FERROUS SULFATE 325(65) MG
325 TABLET ORAL
Status: DISCONTINUED | OUTPATIENT
Start: 2025-03-16 | End: 2025-03-15 | Stop reason: HOSPADM

## 2025-03-15 RX ADMIN — ACETAMINOPHEN 650 MG: 325 TABLET, FILM COATED ORAL at 06:17

## 2025-03-15 RX ADMIN — ACETAMINOPHEN 650 MG: 325 TABLET, FILM COATED ORAL at 14:33

## 2025-03-15 RX ADMIN — SODIUM CHLORIDE 125 MG: 9 INJECTION, SOLUTION INTRAVENOUS at 17:43

## 2025-03-15 ASSESSMENT — PAIN DESCRIPTION - LOCATION: LOCATION: ABDOMEN

## 2025-03-15 ASSESSMENT — PAIN - FUNCTIONAL ASSESSMENT: PAIN_FUNCTIONAL_ASSESSMENT: ACTIVITIES ARE NOT PREVENTED

## 2025-03-15 ASSESSMENT — PAIN SCALES - GENERAL
PAINLEVEL_OUTOF10: 0
PAINLEVEL_OUTOF10: 3

## 2025-03-15 ASSESSMENT — PAIN SCALES - WONG BAKER: WONGBAKER_NUMERICALRESPONSE: NO HURT

## 2025-03-15 ASSESSMENT — PAIN DESCRIPTION - DESCRIPTORS: DESCRIPTORS: CRAMPING

## 2025-03-15 NOTE — DISCHARGE SUMMARY
Physician Discharge Summary     Patient ID:  Blanka Montoya  [id number]  34 y.o.  1990    Admit date: 3/14/2025    Discharge date and time: 3/15/2025    Admitting Physician: Tono Yoder MD     Discharge Physician: Lincoln Martínez MD    Admission Diagnoses: Menorrhagia with regular cycle [N92.0]  Acute on chronic anemia [D64.9]  Abnormal uterine bleeding due to leiomyoma of uterus [N93.9, D25.9]    Discharge Diagnoses: KELLE    Indication for Admission: severe anemia    Hospital Course: Patient is a 33 YO BF, G0 who presented with pelvic pressure/ pain while on menses with hgb of 4.1.  She has known fibroids and longstanding history of anemia with heavy, painful menses. She was admitted for blood transfusion/ stabilization and had a pelvic ultrasound completed during hospital stay. She has a history of a similar admission about 3 years ago and admits to not following up with outpatient gyn as recommended.  She is  to same sex partner and admits to interest in future childbearing.      Internal medicine (Dr. Mauro Morton) was consulted to assist with care and recommended IV iron infusion series, the first infusion was given on 3/15 and Dr. Morton contacted hematology to arrange for outpatient series of infusions.  Continuation of oral iron was also recommended.    By 3/15, patient was feeling improved with plan for d/c home after the 4th unit of blood was completed and iron infusion completed. She plans to follow up with Dr. Tavarez (next gyn unassigned) and will be starting a trial of continuous OCPs (orthocyclen 12 wks/ break 13th week). She will likely need pap updated and endometrial biopsy as outpatient and is interested in consult with ADOLFO to determine best approach for future childbearing. Patient expressed interest in possible myomectomies but for now, would like to start with OCP trial.      U/S was reviewed with patient with interval enlargement of fibroids compared to 12/30/21 study with at  per tablet Take 1 tablet by mouth daily x 12 weeks continuous use, no breaks then placebo pills x 7 days then repeat  Qty: 4 packet, Refills: 3      ibuprofen (ADVIL;MOTRIN) 800 MG tablet Take 1 tablet by mouth every 8 hours as needed for Pain  Qty: 60 tablet, Refills: 1           STOP taking these medications       norethindrone-ethinyl estradiol (ORTHO-NOVUM 7/7/7) 0.5/0.75/1-35 MG-MCG per tablet Comments:   Reason for Stopping:             Activity: activity as tolerated  Diet: regular diet  Wound Care: none needed    Follow-up within 2-3 weeks with Dr. Tavarez    Signed:  Lincoln Martínez MD  3/15/2025  6:31 PM

## 2025-03-15 NOTE — ED NOTES
TRANSFER - OUT REPORT:    Verbal report given to Leah MARTIN on Blanka Montoya  being transferred to med/surg 361 for routine progression of patient care       Report consisted of patient's Situation, Background, Assessment and   Recommendations(SBAR).     Information from the following report(s) ED SBAR was reviewed with the receiving nurse.    Kinder Fall Assessment:                           Lines:   Peripheral IV 03/14/25 Right Antecubital (Active)   Site Assessment Clean, dry & intact 03/14/25 2017   Line Status Blood return noted;Specimen collected;Flushed;Normal saline locked 03/14/25 2017   Phlebitis Assessment No symptoms 03/14/25 2017   Infiltration Assessment 0 03/14/25 2017        Opportunity for questions and clarification was provided.      Patient transported with:  Registered Nurse

## 2025-03-15 NOTE — CONSULTS
[facility] Hospitalist Consult   Admit Date:  3/14/2025  8:10 PM   Name:  Blanka Montoya   Age:  34 y.o.  Sex:  female  :  1990   MRN:  [id number]   Room:  [facility]    Presenting/Chief Complaint: Abdominal Cramping    Reason(s) for Admission: Menorrhagia with regular cycle [N92.0]  Acute on chronic anemia [D64.9]  Abnormal uterine bleeding due to leiomyoma of uterus [N93.9, D25.9]     Hospitalists consulted by Tono Yoder, * for: symptomatic anemia    History of Presenting Illness:   Blanka Montoya is a 34 y.o. female with history of uterine bleeding and fibroids who presented with complaints of shortness of breath, fatigue as well as worsening pelvic pressure.  Upon arrival, patient was noted to have a hemoglobin of 4.1.  She was transfused 3 units of PRBCs and her hemoglobin responded to 6.9.  Given her significant anemia, hospitalist service was consulted by admitting OB/GYN team for further recommendations.    Patient was seen and evaluated at bedside this morning.  She states that she is feeling better after blood transfusions.  Denies any fevers, chills, shortness of breath, nausea, vomiting.  She has had significant fatigue since January.  At that time she had COVID-19 and she thought her symptoms were due to the lingering effects of COVID.  She does have extremely heavy periods and has days when she will average 7-10 pads daily for a few days.  Patient currently does not take any medications.      Assessment & Plan:     Abnormal uterine bleeding due to leiomyoma of uterus  Defer management to primary OB/GYN team.    Symptomatic anemia  Microcytic anemia likely due to iron deficiency  Patient presents with significant symptomatic anemia as well as microcytosis.  Likely due to her heavy periods given she is actively menstruating female.  Hemoglobin 6.9 after 3 units PRBCs given.  I suspect iron panel will show significant iron deficiency.  Plan will be to give her 1 dose of iron IV while  injection 5-40 mL  5-40 mL IntraVENous 2 times per day    sodium chloride flush 0.9 % injection 5-40 mL  5-40 mL IntraVENous PRN    ondansetron (ZOFRAN-ODT) disintegrating tablet 4 mg  4 mg Oral Q8H PRN    Or    ondansetron (ZOFRAN) injection 4 mg  4 mg IntraVENous Q6H PRN    polyethylene glycol (GLYCOLAX) packet 17 g  17 g Oral Daily PRN    acetaminophen (TYLENOL) tablet 650 mg  650 mg Oral Q6H PRN    Or    acetaminophen (TYLENOL) suppository 650 mg  650 mg Rectal Q6H PRN    aluminum & magnesium hydroxide-simethicone (MAALOX PLUS) 200-200-20 MG/5ML suspension 30 mL  30 mL Oral Q6H PRN    acetaminophen (TYLENOL) tablet 650 mg  650 mg Oral Once       Signed:  Mauro Morton MD    Part of this note may have been written by using a voice dictation software.  The note has been proof read but may still contain some grammatical/other typographical errors.

## 2025-03-15 NOTE — H&P
Department of Gynecology  Attending History and Physical        CHIEF COMPLAINT:   pelvic pain and abnormal uterine bleeding    Reason for Admission:  severe anemia; admitted for transfusion    History obtained from patient    HISTORY OF PRESENT ILLNESS:                     The patient is a 34 y.o. G0 female with significant past medical history of uterine bleeding and abnormal uterine bleeding who presents with severe anemia and pelvic pressure  Hx of uterine fibroids  No recent gyn visit  Desires future fertility      Past Medical History:        Diagnosis Date    Abnormal uterine bleeding (AUB) 12/30/2021    Acute blood loss anemia 12/29/2021    Gastrointestinal bleed 12/29/2021     Past Surgical History:    GI endoscope 2021    Past Gynecological History:    1. Last menstrual period: ongoing bleeding  2. Menses: heavy  3. Contraception: same sex partner  4. Sexually transmitted disease history: none        A. Number of sexual partners in the last 6 months: 1; steady partner    5. Pap History: no recent pap        OB History   No obstetric history on file.       Medications Prior to Admission:   Medications Prior to Admission: norethindrone-ethinyl estradiol (ORTHO-NOVUM 7/7/7) 0.5/0.75/1-35 MG-MCG per tablet, Take 1 tablet by mouth daily    Allergies:  Patient has no known allergies.     Social History:    Works for a [employer]    Family History:       Problem Relation Age of Onset    Heart Disease Mother        REVIEW OF SYSTEMS:      Constitutional:  positive for  fatigue and malaise  Eyes:  negative  Ears, nose, mouth, throat, and face:  negative  Respiratory:  positive for  shortness of breath  Cardiovascular:  positive for  pressure  Gastrointestinal:  negative  Genitourinary:  negative  Integument/breast:  negative  Hematologic/lymphatic:  negative  Allergic/Immunologic:  negative  Endocrine:  positive for fatigue  Musculoskeletal:  negative  Neurological:  negative  Behavior/Psych:   transfusion and ultrasound  Follow up out pt  Dr. Tavarez is MD on call for unassigned  Also discussed- many excellent gyn practices in [facility]  Will also need to see ADOLFO for conception, but will need to treat fibroids first    Having some chest pressure  Normal EKG    Currently managing quite well with chronic anemia  Will need an EMB in office at time of follow up

## 2025-03-15 NOTE — ACP (ADVANCE CARE PLANNING)
Advance Care Planning   General Advance Care Planning (ACP) Conversation    Date of Conversation: 3/14/2025  Conducted with: Patient with Decision Making Capacity  Other persons present: None    Healthcare Decision Maker:    Primary Decision Maker: Myriam Wallace - [facility] - [phone]    Today we documented Decision Maker(s) consistent with Legal Next of Kin hierarchy.      Length of Voluntary ACP Conversation in minutes:  <16 minutes (Non-Billable)    Dot Riley RN

## 2025-03-15 NOTE — PROGRESS NOTES
Gynecology Progress Note    Patient is feeling improved after blood transfusion (3 units) and 4th unit in progress.  She continues to have vaginal bleeding with current menses but not passing any large clots.  Most recent pad in bathroom examined with <1/3 of pad with blood (not saturated).  Patient confirms her menses are fairly regular and excessively heavy with bleeding up to 7 days. C/o severe menstrual cramping not alleviated with OTC meds (ibuprofen, aleve, pamprin).  No prior use of OCPs.  Denies hx. Of blood clots, stroke, or migraine with aura. She states she would be willing to try OCPs.     Vitals:  Blood pressure 129/78, pulse 81, temperature 98.6 °F (37 °C), temperature source Oral, resp. rate 16, height 1.6 m (5' 3\"), weight 97.5 kg (215 lb), SpO2 100%.  Temp (24hrs), Av.8 °F (37.1 °C), Min:98.1 °F (36.7 °C), Max:100.6 °F (38.1 °C)        Exam:  Patient without distress. HRRR. LCTA bilateral               Abdomen soft, fibroid palpable about 4-5 cm below umbilicus, mild tenderness                              Lower extremities are negative for swelling, cords, or tenderness.    Lab/Data Review:  HGB 4.1---> 6.9 s/p 3 units  TSH - wnl  Ferritin low @ 4  BMP wnl except calcium low at 8.7    Assessment and Plan:     Abnormal uterine bleeding   Acute on chronic anemia  Symptomatic fibroids  Pelvic pain  Dysmenorrhea    ___________________________________________________________________________________________    - s/p 3 units blood with 4th in progress    - Patient will also be getting 1st IV iron infusion prior to d/c this evening. Plans have been made per internist for outpatient additional IV iron infusion series    - Discussed comprehensive management options for symptomatic fibroids including but not limited to Lysteda, Depo Provera, OCPs, uterine artery embolization, myomectomies, and hysterectomy.  Patient would like to start with a trial of continuous OCPs as she has never taken pills.  Will  provide script for orthocyclen 1 daily continuous use x 12 weeks, break 13th week then repeat cycle, disp 4 packs, refill x 3.  Also sending in Zofran 4 mg ODT prn, disp #20, refill 1 for associated potential side of effect of nausea. Ibuprofen 800 mg every 8 hrs prn dysmenorrhea/ pelvic pain, disp #60, refill x 1 sent to pharmacy for pelvic pain/ dysmenorrhea    - Will arrange for short interval follow up with next unassigned gyn (Dr. Tavarez) to establish gyn care and follow up on initiation of OCPs.  Patient states she is most interested in myomectomies as she would like to keep her uterus and is interested in childbearing ( with same sex partner).  I have encouraged her to discuss with Dr. Tavarez a referral to [facility] group (either [facility] or [facility]).  If referral not required with her insurance, she could also contact preferred [facility] group on her own.     - Work excuse provided for 3/14-3/17/25.     - Plan d/c home this evening with blood transfusion and iron infusion completed.

## 2025-03-15 NOTE — PROGRESS NOTES
TRANSFER - IN REPORT:    Verbal report received from VERONICA Monreal on Blanka Montyoa  being received from [facility] for routine progression of patient care      Report consisted of patient's Situation, Background, Assessment and   Recommendations(SBAR).     Information from the following report(s) Nurse Handoff Report was reviewed with the receiving nurse.    Opportunity for questions and clarification was provided.      Assessment completed upon patient's arrival to unit and care assumed.

## 2025-03-15 NOTE — CONSENT
Informed Consent for Blood Component Transfusion Note    I have discussed with the patient the rationale for blood component transfusion; its benefits in treating or preventing fatigue, organ damage, or death; and its risk which includes mild transfusion reactions, rare risk of blood borne infection, or more serious but rare reactions. I have discussed the alternatives to transfusion, including the risk and consequences of not receiving transfusion. The patient had an opportunity to ask questions and had agreed to proceed with transfusion of blood components.    Electronically signed by Ady Genao DO on 3/14/25 at 8:47 PM EDT

## 2025-03-15 NOTE — PROGRESS NOTES
Discharge instructions reviewed with patient. Opportunity for questions and clarification given. IV removed, cath tip intact, script sent to pharmacy. Education given to patient, patient able to teach-back. No needs stated at this time, patient waiting for ride to arrive.

## 2025-03-15 NOTE — ED PROVIDER NOTES
Emergency Department Provider Note       PCP: No primary care provider on file.   Age: 34 y.o.   Sex: female     DISPOSITION Decision To Admit 03/14/2025 09:48:58 PM   DISPOSITION CONDITION Stable            ICD-10-CM    1. Acute on chronic anemia  D64.9       2. Menorrhagia with regular cycle  N92.0           Medical Decision Making     Lab called with critical hemoglobin of 4.1.  Will type and cross and transfuse 3 units.  Discussed with OB hospitalist who recommended addition of some estrogen and pregnancy test was negative.  Recommended hospitalist admission as hysterectomy or no other intervention would be likely at this time.  Hospitalist declined admission and referred back to OB hospitalist     1 acute complicated illness or injury.  Shared medical decision making was utilized in creating the patients health plan today.    I independently ordered and reviewed each unique test.       I interpreted the EKG with rhythm strip performed at 2111.  Sinus rhythm is noted with a rate of 95 bpm.  No ectopy and no acute ischemic changes..      Critical care procedure note : 60 minutes of critical care time was performed in the emergency department. This was separate from any other procedures listed during the patients emergency department course. The failure to initiate these interventions on an urgent basis would likely have resulted in sudden, clinically significant or life-threatening deterioration in the patients condition.          History     Patient with a past medical history seen for menorrhagia and anemia secondary to menorrhagia presents complaining of onset of vaginal bleeding today and as well as generalized weakness and fatigue.  She is also been having headaches.  She does admit to having some chest pain and shortness of breath with exertion as well.  Bleeding today is described as light compared to previous.  She last had a menstrual cycle about a month ago which was heavy.  She has had transfusions

## 2025-03-15 NOTE — PROGRESS NOTES
0313  Patient states she has not had any significant bleeding since arrival. States she has not bled on her pad, only had slight spotting when urinating.    0523  A scant amount of pink-tinged discharge present on patients Peripad. No blood clots present. Pad changed.

## 2025-03-15 NOTE — CARE COORDINATION
RNCM met with patient in room 361 to discuss discharge planning.     Patient presents to assessment alert and oriented, and answers questions appropriately.  Patient typically lives with spouse and kids in an apartment. At baseline, patient is independent with ADLs.     Demographics verified. Patient has [employer] insurance.  Patient reports she does not have a PCP currently, but she does plan to follow up with Dr. Morrissey.    At this time, anticipate patient to be discharged with no further case management needs. Patient reports she has transportation arrangements when stable for discharge. Case management will continue to follow.  Please notify if there are any changes.          03/15/25 7694   Service Assessment   Patient Orientation Alert and Oriented;Person;Place;Situation;Self   Cognition Alert   History Provided By Patient   Primary Caregiver Self   Accompanied By/Relationship n/a   Support Systems Spouse/Significant Other;Family Members   Patient's Healthcare Decision Maker is: Legal Next of Kin  (Myriam Wallace, spouse [phone])   PCP Verified by CM No  (Patient does not have PCP, plans to follow up with Dr. Morrissey)   Prior Functional Level Independent in ADLs/IADLs   Current Functional Level Independent in ADLs/IADLs   Can patient return to prior living arrangement Yes   Ability to make needs known: Good   Family able to assist with home care needs: Yes   Would you like for me to discuss the discharge plan with any other family members/significant others, and if so, who? No   Financial Resources Other (Comment)  ([employer])   Community Resources None   Social/Functional History   Lives With Spouse   Type of Home Apartment   Prior Level of Assist for ADLs Independent   Prior Level of Assist for Homemaking Independent   Homemaking Responsibilities Yes   Ambulation Assistance Independent   Prior Level of Assist for Transfers Independent   Active  Yes   Mode of Transportation Car   Discharge Planning   Type of  Residence Apartment   Living Arrangements Spouse/Significant Other   Current Services Prior To Admission None   Potential Assistance Needed N/A   DME Ordered? No   Potential Assistance Purchasing Medications No   Type of Home Care Services None   Patient expects to be discharged to: Apartment   History of falls? 0   Services At/After Discharge   Transition of Care Consult (CM Consult) N/A   Services At/After Discharge None    Resource Information Provided? No   Mode of Transport at Discharge Self   Confirm Follow Up Transport Family   Condition of Participation: Discharge Planning   The Plan for Transition of Care is related to the following treatment goals: return to functional baseline with family support in the home

## 2025-03-16 LAB
ABO + RH BLD: NORMAL
BLD PROD TYP BPU: NORMAL
BLOOD BANK BLOOD PRODUCT EXPIRATION DATE: NORMAL
BLOOD BANK CMNT PATIENT-IMP: NORMAL
BLOOD BANK DISPENSE STATUS: NORMAL
BLOOD BANK ISBT PRODUCT BLOOD TYPE: 6200
BLOOD BANK PRODUCT CODE: NORMAL
BLOOD BANK UNIT TYPE AND RH: NORMAL
BLOOD GROUP ANTIBODIES SERPL: NORMAL
BPU ID: NORMAL
CROSSMATCH RESULT: NORMAL
SPECIMEN EXP DATE BLD: NORMAL
UNIT DIVISION: 0
UNIT ISSUE DATE/TIME: NORMAL

## (undated) DEVICE — KENDALL RADIOLUCENT FOAM MONITORING ELECTRODE RECTANGULAR SHAPE: Brand: KENDALL

## (undated) DEVICE — CONNECTOR TBNG OD5-7MM O2 END DISP

## (undated) DEVICE — CANNULA NSL ORAL AD FOR CAPNOFLEX CO2 O2 AIRLFE

## (undated) DEVICE — BLOCK BITE AD 60FR W/ VELC STRP ADDRESSES MOST PT AND